# Patient Record
Sex: MALE | Race: WHITE | NOT HISPANIC OR LATINO | Employment: FULL TIME | URBAN - METROPOLITAN AREA
[De-identification: names, ages, dates, MRNs, and addresses within clinical notes are randomized per-mention and may not be internally consistent; named-entity substitution may affect disease eponyms.]

---

## 2017-01-09 DIAGNOSIS — Z13.9 SCREENING: Primary | ICD-10-CM

## 2017-01-10 ENCOUNTER — OFFICE VISIT (OUTPATIENT)
Dept: FAMILY MEDICINE CLINIC | Facility: CLINIC | Age: 57
End: 2017-01-10

## 2017-01-10 VITALS
HEIGHT: 75 IN | DIASTOLIC BLOOD PRESSURE: 60 MMHG | RESPIRATION RATE: 18 BRPM | HEART RATE: 70 BPM | OXYGEN SATURATION: 97 % | SYSTOLIC BLOOD PRESSURE: 100 MMHG | WEIGHT: 261 LBS | BODY MASS INDEX: 32.45 KG/M2 | TEMPERATURE: 98.2 F

## 2017-01-10 DIAGNOSIS — E78.5 HYPERLIPIDEMIA, UNSPECIFIED HYPERLIPIDEMIA TYPE: Primary | ICD-10-CM

## 2017-01-10 LAB
ALBUMIN SERPL-MCNC: 4.5 G/DL (ref 3.5–5.2)
ALBUMIN/GLOB SERPL: 1.4 G/DL
ALP SERPL-CCNC: 65 U/L (ref 39–117)
ALT SERPL W P-5'-P-CCNC: 22 U/L (ref 1–41)
ANION GAP SERPL CALCULATED.3IONS-SCNC: 14.5 MMOL/L
AST SERPL-CCNC: 15 U/L (ref 1–40)
BILIRUB SERPL-MCNC: 0.5 MG/DL (ref 0.1–1.2)
BUN BLD-MCNC: 21 MG/DL (ref 6–20)
BUN/CREAT SERPL: 21.6 (ref 7–25)
CALCIUM SPEC-SCNC: 9.4 MG/DL (ref 8.6–10.5)
CHLORIDE SERPL-SCNC: 100 MMOL/L (ref 98–107)
CHOLEST SERPL-MCNC: 196 MG/DL (ref 0–200)
CO2 SERPL-SCNC: 23.5 MMOL/L (ref 22–29)
CREAT BLD-MCNC: 0.97 MG/DL (ref 0.76–1.27)
ERYTHROCYTE [DISTWIDTH] IN BLOOD BY AUTOMATED COUNT: 12.5 % (ref 4.5–15)
GFR SERPL CREATININE-BSD FRML MDRD: 80 ML/MIN/1.73
GLOBULIN UR ELPH-MCNC: 3.3 GM/DL
GLUCOSE BLD-MCNC: 96 MG/DL (ref 65–99)
HCT VFR BLD AUTO: 47.8 % (ref 35–60)
HDLC SERPL-MCNC: 39 MG/DL (ref 40–60)
HGB BLD-MCNC: 16 G/DL (ref 13.5–18)
LDLC SERPL CALC-MCNC: 136 MG/DL (ref 0–100)
LDLC/HDLC SERPL: 3.49 {RATIO}
LYMPHOCYTES # BLD AUTO: 2.9 10*3/MM3 (ref 1.2–3.4)
LYMPHOCYTES NFR BLD AUTO: 28 % (ref 21–51)
MCH RBC QN AUTO: 30.4 PG (ref 26.1–33.1)
MCHC RBC AUTO-ENTMCNC: 33.5 G/DL (ref 33–37)
MCV RBC AUTO: 90.7 FL (ref 80–99)
MONOCYTES # BLD AUTO: 0.5 10*3/MM3 (ref 0.1–0.6)
MONOCYTES NFR BLD AUTO: 5 % (ref 2–9)
NEUTROPHILS # BLD AUTO: 6.9 10*3/MM3 (ref 1.4–6.5)
NEUTROPHILS NFR BLD AUTO: 67 % (ref 42–75)
PLATELET # BLD AUTO: 235 10*3/MM3 (ref 150–450)
PMV BLD AUTO: 9 FL (ref 7.1–10.5)
POTASSIUM BLD-SCNC: 4.4 MMOL/L (ref 3.5–5.2)
PROT SERPL-MCNC: 7.8 G/DL (ref 6–8.5)
RBC # BLD AUTO: 5.26 10*6/MM3 (ref 4–6)
SODIUM BLD-SCNC: 138 MMOL/L (ref 136–145)
TRIGL SERPL-MCNC: 104 MG/DL (ref 0–150)
VLDLC SERPL-MCNC: 20.8 MG/DL (ref 5–40)
WBC NRBC COR # BLD: 10.3 10*3/MM3 (ref 4.5–10)

## 2017-01-10 PROCEDURE — 85025 COMPLETE CBC W/AUTO DIFF WBC: CPT | Performed by: FAMILY MEDICINE

## 2017-01-10 PROCEDURE — 84436 ASSAY OF TOTAL THYROXINE: CPT | Performed by: FAMILY MEDICINE

## 2017-01-10 PROCEDURE — 84479 ASSAY OF THYROID (T3 OR T4): CPT | Performed by: FAMILY MEDICINE

## 2017-01-10 PROCEDURE — 80061 LIPID PANEL: CPT | Performed by: FAMILY MEDICINE

## 2017-01-10 PROCEDURE — 84443 ASSAY THYROID STIM HORMONE: CPT | Performed by: FAMILY MEDICINE

## 2017-01-10 PROCEDURE — 80053 COMPREHEN METABOLIC PANEL: CPT | Performed by: FAMILY MEDICINE

## 2017-01-10 PROCEDURE — 99213 OFFICE O/P EST LOW 20 MIN: CPT | Performed by: FAMILY MEDICINE

## 2017-01-10 NOTE — PROGRESS NOTES
SUBJECTIVE:  The patient is a 56-year-old white male is here for follow-up and labs.  He has a history of hyperlipidemia but is not on any medicines currently.  Been dieting and lost quite a bit of weight as of late.    PAST MEDICAL HISTORY:  Reviewed.    REVIEW OF SYSTEMS:  Please see above; 14 point ROS otherwise negative.      OBJECTIVE: Vitals signs are reviewed and are stable.    HEENT: PERRLA.    Neck:  Supple.    Lungs:  Clear.    Heart:  Regular rate and rhythm.    Abdomen:   Soft, nontender.    Extremities:  No cyanosis, clubbing or edema.      ASSESSMENT:     Hyperlipidemia    PLAN:   NP fasting lipids.  Follow-up in a couple days on her labs.  Call if problems.  Diet and exercise are discussed.    Much of this encounter note is an electronic transcription/translation of spoken language to printed text.  The electronic translation of spoken language may permit erroneous, or at times, nonsensical words or phrases to be inadvertently transcribed.  Although I have reviewed the note for such errors, some may still exist.

## 2017-01-10 NOTE — MR AVS SNAPSHOT
Aguilar Kennedy   1/10/2017 1:50 PM   Office Visit    Dept Phone:  [phone]   Encounter #:  [id number]    Provider:  Jerzy Leon MD   Department:  [facility] MED                Your Full Care Plan              Today's Medication Changes          These changes are accurate as of: 1/10/17  3:34 PM.  If you have any questions, ask your nurse or doctor.               Stop taking medication(s)listed here:     diclofenac 50 MG EC tablet   Commonly known as:  VOLTAREN   Stopped by:  Jerzy Leon MD           fluticasone 50 MCG/ACT nasal spray   Commonly known as:  FLONASE   Stopped by:  Jerzy Leon MD                      Your Updated Medication List      Notice  As of 1/10/2017  3:34 PM    You have not been prescribed any medications.            We Performed the Following     Thyroid Panel With TSH       You Were Diagnosed With        Codes Comments    Hyperlipidemia, unspecified hyperlipidemia type    -  Primary ICD-10-CM: E78.5  ICD-9-CM: 272.4       Instructions     None    Patient Instructions History      Upcoming Appointments     Visit Type Date Time Department    WALK-IN 1/10/2017  1:50 PM [facility]      [employer] Signup     [facility] [employer] allows you to send messages to your doctor, view your test results, renew your prescriptions, schedule appointments, and more. To sign up, go to [employer] and click on the Sign Up Now link in the New User? box. Enter your [employer] Activation Code exactly as it appears below along with the last four digits of your Social Security Number and your Date of Birth () to complete the sign-up process. If you do not sign up before the expiration date, you must request a new code.    [employer] Activation Code: [email or web address]  Expires: 2017  3:33 PM    If you have questions, you can email [email or web address] or call [phone] to talk to our [employer] staff. Remember, [employer]  "is NOT to be used for urgent needs. For medical emergencies, dial 911.               Other Info from Your Visit           Allergies     No Known Allergies      Reason for Visit     Hyperlipidemia had been on meds in past now has been normal has family hx of carotid artery stenosis and small in size    Foot Pain leland      Vital Signs     Blood Pressure Pulse Temperature Respirations Height Weight    100/60 (BP Location: Left arm, Patient Position: Sitting) 70 98.2 °F (36.8 °C) (Oral) 18 75\" (190.5 cm) 261 lb (118 kg)    Oxygen Saturation Body Mass Index Smoking Status             97% 32.62 kg/m2 Former Smoker         Problems and Diagnoses Noted     High cholesterol or triglycerides    -  Primary        "

## 2017-01-11 LAB
T-UPTAKE NFR SERPL: 1.1 TBI (ref 0.8–1.3)
T4 SERPL-MCNC: 7 MCG/DL (ref 4.5–11.7)
TSH SERPL DL<=0.05 MIU/L-ACNC: 1.18 MIU/ML (ref 0.27–4.2)

## 2017-12-08 ENCOUNTER — OFFICE VISIT (OUTPATIENT)
Dept: FAMILY MEDICINE CLINIC | Facility: CLINIC | Age: 57
End: 2017-12-08

## 2017-12-08 VITALS
TEMPERATURE: 97.9 F | BODY MASS INDEX: 32.33 KG/M2 | DIASTOLIC BLOOD PRESSURE: 72 MMHG | WEIGHT: 260 LBS | HEIGHT: 75 IN | HEART RATE: 78 BPM | SYSTOLIC BLOOD PRESSURE: 132 MMHG | OXYGEN SATURATION: 97 %

## 2017-12-08 DIAGNOSIS — D17.1 LIPOMA OF TORSO: ICD-10-CM

## 2017-12-08 DIAGNOSIS — M79.672 PAIN OF LEFT HEEL: Primary | ICD-10-CM

## 2017-12-08 DIAGNOSIS — M72.2 PLANTAR FASCIITIS: ICD-10-CM

## 2017-12-08 DIAGNOSIS — E66.09 CLASS 1 OBESITY DUE TO EXCESS CALORIES WITHOUT SERIOUS COMORBIDITY WITH BODY MASS INDEX (BMI) OF 32.0 TO 32.9 IN ADULT: ICD-10-CM

## 2017-12-08 PROCEDURE — 73630 X-RAY EXAM OF FOOT: CPT | Performed by: NURSE PRACTITIONER

## 2017-12-08 PROCEDURE — 99213 OFFICE O/P EST LOW 20 MIN: CPT | Performed by: NURSE PRACTITIONER

## 2017-12-08 RX ORDER — IBUPROFEN 800 MG/1
800 TABLET ORAL EVERY 6 HOURS PRN
Qty: 60 TABLET | Refills: 1 | Status: SHIPPED | OUTPATIENT
Start: 2017-12-08 | End: 2020-08-07

## 2017-12-08 NOTE — PATIENT INSTRUCTIONS
erx  mg TID prn pain after meal for the next 10-14 days, xray shows heel spur, gave pt education on plantar fasciitis and enc stretches at home, gave education on lipoma and defer sgn consult at this time, Enc healthy diet and regular exercise for wt loss  Lipoma  A lipoma is a noncancerous (benign) tumor that is made up of fat cells. This is a very common type of soft-tissue growth. Lipomas are usually found under the skin (subcutaneous). They may occur in any tissue of the body that contains fat. Common areas for lipomas to appear include the back, shoulders, buttocks, and thighs. Lipomas grow slowly, and they are usually painless. Most lipomas do not cause problems and do not require treatment.  CAUSES  The cause of this condition is not known.  RISK FACTORS  This condition is more likely to develop in:  · People who are 40-60 years old.  · People who have a family history of lipomas.  SYMPTOMS  A lipoma usually appears as a small, round bump under the skin. It may feel soft or rubbery, but the firmness can vary. Most lipomas are not painful. However, a lipoma may become painful if it is located in an area where it pushes on nerves.  DIAGNOSIS  A lipoma can usually be diagnosed with a physical exam. You may also have tests to confirm the diagnosis and to rule out other conditions. Tests may include:  · Imaging tests, such as a CT scan or MRI.  · Removal of a tissue sample to be looked at under a microscope (biopsy).  TREATMENT  Treatment is not needed for small lipomas that are not causing problems. If a lipoma continues to get bigger or it causes problems, removal is often the best option. Lipomas can also be removed to improve appearance. Removal of a lipoma is usually done with a surgery in which the fatty cells and the surrounding capsule are removed. Most often, a medicine that numbs the area (local anesthetic) is used for this procedure.  HOME CARE INSTRUCTIONS  · Keep all follow-up visits as  directed by your health care provider. This is important.  SEEK MEDICAL CARE IF:  · Your lipoma becomes larger or hard.  · Your lipoma becomes painful, red, or increasingly swollen. These could be signs of infection or a more serious condition.     This information is not intended to replace advice given to you by your health care provider. Make sure you discuss any questions you have with your health care provider.     Document Released: 12/08/2003 Document Revised: 05/03/2016 Document Reviewed: 12/14/2015  [employer] Interactive Patient Education ©2017 [employer]    Plantar Fasciitis With Rehab  The plantar fascia is a fibrous, ligament-like, soft-tissue structure that spans the bottom of the foot. Plantar fasciitis, also called heel spur syndrome, is a condition that causes pain in the foot due to inflammation of the tissue.  SYMPTOMS   · Pain and tenderness on the underneath side of the foot.  · Pain that worsens with standing or walking.  CAUSES   Plantar fasciitis is caused by irritation and injury to the plantar fascia on the underneath side of the foot. Common mechanisms of injury include:  · Direct trauma to bottom of the foot.  · Damage to a small nerve that runs under the foot where the main fascia attaches to the heel bone.  · Stress placed on the plantar fascia due to bone spurs.  RISK INCREASES WITH:   · Activities that place stress on the plantar fascia (running, jumping, pivoting, or cutting).  · Poor strength and flexibility.  · Improperly fitted shoes.  · Tight calf muscles.  · Flat feet.  · Failure to warm-up properly before activity.  · Obesity.  PREVENTION  · Warm up and stretch properly before activity.  · Allow for adequate recovery between workouts.  · Maintain physical fitness:    Strength, flexibility, and endurance.    Cardiovascular fitness.  · Maintain a health body weight.  · Avoid stress on the plantar fascia.  · Wear properly fitted shoes, including arch supports for individuals who  have flat feet.  PROGNOSIS   If treated properly, then the symptoms of plantar fasciitis usually resolve without surgery. However, occasionally surgery is necessary.  RELATED COMPLICATIONS   · Recurrent symptoms that may result in a chronic condition.  · Problems of the lower back that are caused by compensating for the injury, such as limping.  · Pain or weakness of the foot during push-off following surgery.  · Chronic inflammation, scarring, and partial or complete fascia tear, occurring more often from repeated injections.  TREATMENT   Treatment initially involves the use of ice and medication to help reduce pain and inflammation. The use of strengthening and stretching exercises may help reduce pain with activity, especially stretches of the Achilles tendon. These exercises may be performed at home or with a therapist. Your caregiver may recommend that you use heel cups of arch supports to help reduce stress on the plantar fascia. Occasionally, corticosteroid injections are given to reduce inflammation. If symptoms persist for greater than 6 months despite non-surgical (conservative), then surgery may be recommended.   MEDICATION   · If pain medication is necessary, then nonsteroidal anti-inflammatory medications, such as aspirin and ibuprofen, or other minor pain relievers, such as acetaminophen, are often recommended.  · Do not take pain medication within 7 days before surgery.  · Prescription pain relievers may be given if deemed necessary by your caregiver. Use only as directed and only as much as you need.  · Corticosteroid injections may be given by your caregiver. These injections should be reserved for the most serious cases, because they may only be given a certain number of times.  HEAT AND COLD  · Cold treatment (icing) relieves pain and reduces inflammation. Cold treatment should be applied for 10 to 15 minutes every 2 to 3 hours for inflammation and pain and immediately after any activity that  aggravates your symptoms. Use ice packs or massage the area with a piece of ice (ice massage).  · Heat treatment may be used prior to performing the stretching and strengthening activities prescribed by your caregiver, physical therapist, or . Use a heat pack or soak the injury in warm water.  SEEK IMMEDIATE MEDICAL CARE IF:  · Treatment seems to offer no benefit, or the condition worsens.  · Any medications produce adverse side effects.  EXERCISES  RANGE OF MOTION (ROM) AND STRETCHING EXERCISES - Plantar Fasciitis (Heel Spur Syndrome)  These exercises may help you when beginning to rehabilitate your injury. Your symptoms may resolve with or without further involvement from your physician, physical therapist or . While completing these exercises, remember:   · Restoring tissue flexibility helps normal motion to return to the joints. This allows healthier, less painful movement and activity.  · An effective stretch should be held for at least 30 seconds.  · A stretch should never be painful. You should only feel a gentle lengthening or release in the stretched tissue.  RANGE OF MOTION - Toe Extension, Flexion  · Sit with your right / left leg crossed over your opposite knee.  · Grasp your toes and gently pull them back toward the top of your foot. You should feel a stretch on the bottom of your toes and/or foot.  · Hold this stretch for __________ seconds.  · Now, gently pull your toes toward the bottom of your foot. You should feel a stretch on the top of your toes and or foot.  · Hold this stretch for __________ seconds.  Repeat __________ times. Complete this stretch __________ times per day.   RANGE OF MOTION - Ankle Dorsiflexion, Active Assisted  · Remove shoes and sit on a chair that is preferably not on a carpeted surface.  · Place right / left foot under knee. Extend your opposite leg for support.  · Keeping your heel down, slide your right / left foot back toward the chair  until you feel a stretch at your ankle or calf. If you do not feel a stretch, slide your bottom forward to the edge of the chair, while still keeping your heel down.  · Hold this stretch for __________ seconds.  Repeat __________ times. Complete this stretch __________ times per day.   STRETCH - Gastroc, Standing  · Place hands on wall.  · Extend right / left leg, keeping the front knee somewhat bent.  · Slightly point your toes inward on your back foot.  · Keeping your right / left heel on the floor and your knee straight, shift your weight toward the wall, not allowing your back to arch.  · You should feel a gentle stretch in the right / left calf. Hold this position for __________ seconds.  Repeat __________ times. Complete this stretch __________ times per day.  STRETCH - Soleus, Standing  · Place hands on wall.  · Extend right / left leg, keeping the other knee somewhat bent.  · Slightly point your toes inward on your back foot.  · Keep your right / left heel on the floor, bend your back knee, and slightly shift your weight over the back leg so that you feel a gentle stretch deep in your back calf.  · Hold this position for __________ seconds.  Repeat __________ times. Complete this stretch __________ times per day.  STRETCH - Gastrocsoleus, Standing   Note: This exercise can place a lot of stress on your foot and ankle. Please complete this exercise only if specifically instructed by your caregiver.   · Place the ball of your right / left foot on a step, keeping your other foot firmly on the same step.  · Hold on to the wall or a rail for balance.  · Slowly lift your other foot, allowing your body weight to press your heel down over the edge of the step.  · You should feel a stretch in your right / left calf.  · Hold this position for __________ seconds.  · Repeat this exercise with a slight bend in your right / left knee.  Repeat __________ times. Complete this stretch __________ times per day.    STRENGTHENING EXERCISES - Plantar Fasciitis (Heel Spur Syndrome)   These exercises may help you when beginning to rehabilitate your injury. They may resolve your symptoms with or without further involvement from your physician, physical therapist or . While completing these exercises, remember:   · Muscles can gain both the endurance and the strength needed for everyday activities through controlled exercises.  · Complete these exercises as instructed by your physician, physical therapist or . Progress the resistance and repetitions only as guided.  STRENGTH - Towel Curls  · Sit in a chair positioned on a non-carpeted surface.  · Place your foot on a towel, keeping your heel on the floor.  · Pull the towel toward your heel by only curling your toes. Keep your heel on the floor.  · If instructed by your physician, physical therapist or , add ____________________ at the end of the towel.  Repeat __________ times. Complete this exercise __________ times per day.  STRENGTH - Ankle Inversion  · Secure one end of a rubber exercise band/tubing to a fixed object (table, pole). Loop the other end around your foot just before your toes.  · Place your fists between your knees. This will focus your strengthening at your ankle.  · Slowly, pull your big toe up and in, making sure the band/tubing is positioned to resist the entire motion.  · Hold this position for __________ seconds.  · Have your muscles resist the band/tubing as it slowly pulls your foot back to the starting position.  Repeat __________ times. Complete this exercises __________ times per day.      This information is not intended to replace advice given to you by your health care provider. Make sure you discuss any questions you have with your health care provider.     Document Released: 12/18/2006 Document Revised: 05/03/2016 Document Reviewed: 10/31/2016  [employer] Interactive Patient Education ©2017 [employer]

## 2017-12-08 NOTE — PROGRESS NOTES
Subjective   Aguilar Kennedy is a 57 y.o. male.     History of Present Illness   C/o R heel pain x 3 wks, no injury trauma or trigger event, prolonged walking with job works , wears steel toed boots, has tried insert and helped somewhat, tried switching shoes without much change, pain 4/10, OTC  mg helps, With hx of B foot pain 05/16 Dr Bui xray NAD erx diclofenac and sent to podiatry Dr Marquez but never went  C/o enlarging bump on upper back > 1 year not enlarging nontender but wondering if needs removed, fam hx father skin ca on lip  Admits working on healthy low carb diet and exercise with weight loss 24 lbs since 05/16    The following portions of the patient's history were reviewed and updated as appropriate: allergies, current medications, past family history, past medical history, past social history, past surgical history and problem list.    Review of Systems   Constitutional: Negative for fever.   Respiratory: Negative for cough, shortness of breath and wheezing.    Cardiovascular: Negative for chest pain, palpitations and leg swelling.   Musculoskeletal: Positive for arthralgias. Negative for back pain, gait problem, joint swelling, myalgias, neck pain and neck stiffness.   Skin:        Skin nodule   Neurological: Negative for dizziness and headaches.   All other systems reviewed and are negative.      Objective   Physical Exam   Constitutional: He is oriented to person, place, and time. He appears well-developed and well-nourished.   HENT:   Head: Normocephalic and atraumatic.   Eyes: Conjunctivae and EOM are normal. Pupils are equal, round, and reactive to light.   Cardiovascular: Normal rate, regular rhythm and normal heart sounds.    Pulmonary/Chest: Effort normal and breath sounds normal.   Musculoskeletal: Normal range of motion. He exhibits tenderness (L heel point tenderness, no plantar warts, sensation intact, pulses strong, well hydrated, no ulcers or fungal toenails, FROM;  B tenderness plantar fasciia).   Neurological: He is alert and oriented to person, place, and time.   Skin: Skin is warm and dry.   Flesh colored well circumscribed nodule R upper back nontender, no erythema or dc or bleeding   Psychiatric: He has a normal mood and affect. His behavior is normal. Judgment and thought content normal.   Vitals reviewed.  xray L foot 3v without comparison for heel pain shows L heel spur awaiting radiology review    Assessment/Plan   Aguilar was seen today for foot swelling and cyst.    Diagnoses and all orders for this visit:    Pain of left heel  -     XR foot 3+ vw left    Plantar fasciitis    Lipoma of torso    Class 1 obesity due to excess calories without serious comorbidity with body mass index (BMI) of 32.0 to 32.9 in adult    Other orders  -     ibuprofen (ADVIL,MOTRIN) 800 MG tablet; Take 1 tablet by mouth Every 6 (Six) Hours As Needed for Moderate Pain .    erx  mg TID prn pain after meal for the next 10-14 days, xray shows heel spur, gave pt education on plantar fasciitis and enc stretches at home, gave education on lipoma and defer sgn consult at this time, Enc healthy diet and regular exercise for wt loss

## 2017-12-12 ENCOUNTER — TELEPHONE (OUTPATIENT)
Dept: FAMILY MEDICINE CLINIC | Facility: CLINIC | Age: 57
End: 2017-12-12

## 2017-12-12 NOTE — TELEPHONE ENCOUNTER
Xray foot showing mild arthritis and narrowing at 1st toe, how is he feeling on IBU for heel spur?

## 2018-08-15 ENCOUNTER — TELEPHONE (OUTPATIENT)
Dept: FAMILY MEDICINE CLINIC | Facility: CLINIC | Age: 58
End: 2018-08-15

## 2018-08-15 DIAGNOSIS — M79.672 LEFT FOOT PAIN: Primary | ICD-10-CM

## 2018-08-15 DIAGNOSIS — M77.32 HEEL SPUR, LEFT: ICD-10-CM

## 2018-08-15 DIAGNOSIS — M19.90 ARTHRITIS: ICD-10-CM

## 2018-08-15 NOTE — TELEPHONE ENCOUNTER
Wanting a referral for a podiatrist BC he has pain in his heels, ASAP please... They want it in the [facility] area by [facility]... Dr hutchison possibly

## 2018-12-13 ENCOUNTER — TELEPHONE (OUTPATIENT)
Dept: FAMILY MEDICINE CLINIC | Facility: CLINIC | Age: 58
End: 2018-12-13

## 2018-12-13 NOTE — TELEPHONE ENCOUNTER
PT WAS SEEN OUT OF STATE AT AN URGENT CARE OFFICE FOR LOWER BACK PAIN AND SPASMS. BECAUSE OF HIS INSURANCE, [employer], HE IS NEEDING A REFERRAL REGARDING THIS AND ASKED IF DR. CLARKE CAN DO THIS?

## 2019-03-26 ENCOUNTER — TELEPHONE (OUTPATIENT)
Dept: FAMILY MEDICINE CLINIC | Facility: CLINIC | Age: 59
End: 2019-03-26

## 2019-03-26 RX ORDER — ONDANSETRON 4 MG/1
4 TABLET, FILM COATED ORAL EVERY 8 HOURS PRN
Qty: 20 TABLET | Refills: 0 | Status: SHIPPED | OUTPATIENT
Start: 2019-03-26 | End: 2019-03-26

## 2019-03-26 RX ORDER — SCOLOPAMINE TRANSDERMAL SYSTEM 1 MG/1
1 PATCH, EXTENDED RELEASE TRANSDERMAL
Qty: 10 EACH | Refills: 0 | Status: SHIPPED | OUTPATIENT
Start: 2019-03-26 | End: 2020-08-07

## 2019-03-26 RX ORDER — ONDANSETRON 4 MG/1
8 TABLET, FILM COATED ORAL EVERY 8 HOURS PRN
Qty: 10 TABLET | Refills: 0 | Status: SHIPPED | OUTPATIENT
Start: 2019-03-26 | End: 2019-03-26

## 2019-03-26 RX ORDER — ONDANSETRON 4 MG/1
TABLET, FILM COATED ORAL
Qty: 10 TABLET | Refills: 0 | Status: SHIPPED | OUTPATIENT
Start: 2019-03-26 | End: 2020-08-07

## 2019-03-26 NOTE — TELEPHONE ENCOUNTER
PATIENT WANTS TO KNOW IF ZOFRAN AND SCOPOLAMINE PATCHES.       PATIENT IS GOING ON A CRUISE FOR 14 DAY AND WANTS TO KNOW IF THESE CAN BE CALLED IN

## 2020-08-07 ENCOUNTER — OFFICE VISIT (OUTPATIENT)
Dept: FAMILY MEDICINE CLINIC | Facility: CLINIC | Age: 60
End: 2020-08-07

## 2020-08-07 VITALS
TEMPERATURE: 97.9 F | RESPIRATION RATE: 20 BRPM | DIASTOLIC BLOOD PRESSURE: 64 MMHG | WEIGHT: 284 LBS | HEART RATE: 74 BPM | SYSTOLIC BLOOD PRESSURE: 126 MMHG | HEIGHT: 75 IN | OXYGEN SATURATION: 96 % | BODY MASS INDEX: 35.31 KG/M2

## 2020-08-07 DIAGNOSIS — Z11.59 ENCOUNTER FOR HEPATITIS C SCREENING TEST FOR LOW RISK PATIENT: ICD-10-CM

## 2020-08-07 DIAGNOSIS — R06.02 SHORTNESS OF BREATH: ICD-10-CM

## 2020-08-07 DIAGNOSIS — G47.33 OSA ON CPAP: ICD-10-CM

## 2020-08-07 DIAGNOSIS — Z99.89 OSA ON CPAP: ICD-10-CM

## 2020-08-07 DIAGNOSIS — E66.01 CLASS 2 SEVERE OBESITY DUE TO EXCESS CALORIES WITH SERIOUS COMORBIDITY AND BODY MASS INDEX (BMI) OF 35.0 TO 35.9 IN ADULT (HCC): ICD-10-CM

## 2020-08-07 DIAGNOSIS — Z12.5 PROSTATE CANCER SCREENING: ICD-10-CM

## 2020-08-07 DIAGNOSIS — R53.82 CHRONIC FATIGUE: ICD-10-CM

## 2020-08-07 DIAGNOSIS — D17.9 LIPOMA, UNSPECIFIED SITE: ICD-10-CM

## 2020-08-07 DIAGNOSIS — R07.89 CHEST TIGHTNESS: Primary | ICD-10-CM

## 2020-08-07 LAB
25(OH)D3 SERPL-MCNC: 28.2 NG/ML (ref 30–100)
ALBUMIN SERPL-MCNC: 4.3 G/DL (ref 3.5–5.2)
ALBUMIN/GLOB SERPL: 1.7 G/DL
ALP SERPL-CCNC: 53 U/L (ref 39–117)
ALT SERPL W P-5'-P-CCNC: 26 U/L (ref 1–41)
ANION GAP SERPL CALCULATED.3IONS-SCNC: 8.7 MMOL/L (ref 5–15)
AST SERPL-CCNC: 18 U/L (ref 1–40)
BACTERIA UR QL AUTO: ABNORMAL /HPF
BILIRUB SERPL-MCNC: 0.6 MG/DL (ref 0–1.2)
BILIRUB UR QL STRIP: NEGATIVE
BUN SERPL-MCNC: 15 MG/DL (ref 8–23)
BUN/CREAT SERPL: 15.6 (ref 7–25)
CALCIUM SPEC-SCNC: 8.9 MG/DL (ref 8.6–10.5)
CHLORIDE SERPL-SCNC: 102 MMOL/L (ref 98–107)
CHOLEST SERPL-MCNC: 178 MG/DL (ref 0–200)
CLARITY UR: CLEAR
CO2 SERPL-SCNC: 25.3 MMOL/L (ref 22–29)
COLOR UR: YELLOW
CREAT SERPL-MCNC: 0.96 MG/DL (ref 0.76–1.27)
ERYTHROCYTE [DISTWIDTH] IN BLOOD BY AUTOMATED COUNT: 13.4 % (ref 12.3–15.4)
GFR SERPL CREATININE-BSD FRML MDRD: 80 ML/MIN/1.73
GLOBULIN UR ELPH-MCNC: 2.5 GM/DL
GLUCOSE SERPL-MCNC: 127 MG/DL (ref 65–99)
GLUCOSE UR STRIP-MCNC: NEGATIVE MG/DL
HCT VFR BLD AUTO: 47.9 % (ref 37.5–51)
HCV AB SER DONR QL: NORMAL
HDLC SERPL-MCNC: 36 MG/DL (ref 40–60)
HGB BLD-MCNC: 15.7 G/DL (ref 13–17.7)
HGB UR QL STRIP.AUTO: ABNORMAL
KETONES UR QL STRIP: NEGATIVE
LDLC SERPL CALC-MCNC: 123 MG/DL (ref 0–100)
LDLC/HDLC SERPL: 3.43 {RATIO}
LEUKOCYTE ESTERASE UR QL STRIP.AUTO: NEGATIVE
LYMPHOCYTES # BLD AUTO: 2.6 10*3/MM3 (ref 0.7–3.1)
LYMPHOCYTES NFR BLD AUTO: 32.3 % (ref 19.6–45.3)
MCH RBC QN AUTO: 30 PG (ref 26.6–33)
MCHC RBC AUTO-ENTMCNC: 32.7 G/DL (ref 31.5–35.7)
MCV RBC AUTO: 91.9 FL (ref 79–97)
MONOCYTES # BLD AUTO: 0.3 10*3/MM3 (ref 0.1–0.9)
MONOCYTES NFR BLD AUTO: 3.2 % (ref 5–12)
NEUTROPHILS NFR BLD AUTO: 5.1 10*3/MM3 (ref 1.7–7)
NEUTROPHILS NFR BLD AUTO: 64.5 % (ref 42.7–76)
NITRITE UR QL STRIP: NEGATIVE
PH UR STRIP.AUTO: 5.5 [PH] (ref 4.6–8)
PLATELET # BLD AUTO: 255 10*3/MM3 (ref 140–450)
PMV BLD AUTO: 8.3 FL (ref 6–12)
POTASSIUM SERPL-SCNC: 4.4 MMOL/L (ref 3.5–5.2)
PROT SERPL-MCNC: 6.8 G/DL (ref 6–8.5)
PROT UR QL STRIP: NEGATIVE
PSA SERPL-MCNC: 5.74 NG/ML (ref 0–4)
RBC # BLD AUTO: 5.21 10*6/MM3 (ref 4.14–5.8)
RBC # UR: ABNORMAL /HPF
REF LAB TEST METHOD: ABNORMAL
SODIUM SERPL-SCNC: 136 MMOL/L (ref 136–145)
SP GR UR STRIP: >=1.03 (ref 1–1.03)
SQUAMOUS #/AREA URNS HPF: ABNORMAL /HPF
TRIGL SERPL-MCNC: 93 MG/DL (ref 0–150)
TSH SERPL DL<=0.05 MIU/L-ACNC: 1.72 UIU/ML (ref 0.27–4.2)
UROBILINOGEN UR QL STRIP: ABNORMAL
VLDLC SERPL-MCNC: 18.6 MG/DL (ref 5–40)
WBC # BLD AUTO: 7.9 10*3/MM3 (ref 3.4–10.8)
WBC UR QL AUTO: ABNORMAL /HPF

## 2020-08-07 PROCEDURE — 81001 URINALYSIS AUTO W/SCOPE: CPT | Performed by: NURSE PRACTITIONER

## 2020-08-07 PROCEDURE — 86803 HEPATITIS C AB TEST: CPT | Performed by: NURSE PRACTITIONER

## 2020-08-07 PROCEDURE — 99214 OFFICE O/P EST MOD 30 MIN: CPT | Performed by: NURSE PRACTITIONER

## 2020-08-07 PROCEDURE — 93000 ELECTROCARDIOGRAM COMPLETE: CPT | Performed by: NURSE PRACTITIONER

## 2020-08-07 PROCEDURE — 85025 COMPLETE CBC W/AUTO DIFF WBC: CPT | Performed by: NURSE PRACTITIONER

## 2020-08-07 PROCEDURE — G0103 PSA SCREENING: HCPCS | Performed by: NURSE PRACTITIONER

## 2020-08-07 PROCEDURE — 82306 VITAMIN D 25 HYDROXY: CPT | Performed by: NURSE PRACTITIONER

## 2020-08-07 PROCEDURE — 36415 COLL VENOUS BLD VENIPUNCTURE: CPT | Performed by: NURSE PRACTITIONER

## 2020-08-07 PROCEDURE — 80061 LIPID PANEL: CPT | Performed by: NURSE PRACTITIONER

## 2020-08-07 PROCEDURE — 84443 ASSAY THYROID STIM HORMONE: CPT | Performed by: NURSE PRACTITIONER

## 2020-08-07 PROCEDURE — 80053 COMPREHEN METABOLIC PANEL: CPT | Performed by: NURSE PRACTITIONER

## 2020-08-07 PROCEDURE — 71046 X-RAY EXAM CHEST 2 VIEWS: CPT | Performed by: NURSE PRACTITIONER

## 2020-08-07 RX ORDER — AZITHROMYCIN 250 MG/1
TABLET, FILM COATED ORAL
Qty: 6 TABLET | Refills: 0 | Status: SHIPPED | OUTPATIENT
Start: 2020-08-07 | End: 2020-09-04

## 2020-08-07 NOTE — PATIENT INSTRUCTIONS
check labs and call with results, CXR and EKG NAD today, consider bronchitis v post infectious viral syndrome from last month and consider COVID antibody testing in a month if sx persist or worsen, erx zpack use as directed and monitor sx, consider sleep apnea consult, defer MOISES, refer gen sgn

## 2020-08-07 NOTE — PROGRESS NOTES
Subjective   Aguilar Kennedy is a 60 y.o. male.     History of Present Illness   C/o fatigue x 2 weeks with SOA and prod cough, with chest tightness and PND but no wheezing sx beginning 07/22/20, no ear pain, sinus pain, sore throat or fever, had chills last week, works  [employer], no known exposure to COVID, lives with wife who doesn't work outside the home, no GI or urin sx prev saw Dr Constantino urology > 10 years for normal prostate biopsy, prev went to [place] approx a month ago with fever and had testing few days later and negative, former smoker x 30 years no prev inhaler no recent CXR, with chronic GERRI using machine and some weight gain but compliant nightly and no recent sleep medicine eval, normal stress test 08/14 no prev dx HTN no CP dizziness HA LE edema but strong fam hx of heart disease and hx of high chol used to take lipitor many years ago  C/o enlarging nodule on upper R back x 1 year no pain and request eval   States UTD colonoscopy within the last 10 years    The following portions of the patient's history were reviewed and updated as appropriate: allergies, current medications, past family history, past medical history, past social history, past surgical history and problem list.    Review of Systems   Constitutional: Positive for chills and fatigue. Negative for activity change, appetite change, diaphoresis, fever and unexpected weight change.   HENT: Positive for congestion and postnasal drip. Negative for dental problem, drooling, ear discharge, ear pain, facial swelling, hearing loss, mouth sores, nosebleeds, rhinorrhea, sinus pressure, sinus pain, sneezing, sore throat, tinnitus, trouble swallowing and voice change.    Respiratory: Positive for cough, chest tightness and shortness of breath. Negative for apnea, choking, wheezing and stridor.    Cardiovascular: Negative for chest pain, palpitations and leg swelling.   Gastrointestinal: Negative for abdominal distention, abdominal  pain, anal bleeding, blood in stool, constipation, diarrhea, nausea, rectal pain and vomiting.   Skin:        Skin nodule   Allergic/Immunologic: Negative for environmental allergies.   Neurological: Negative for dizziness and headaches.   Psychiatric/Behavioral: Positive for sleep disturbance (GERRI).   All other systems reviewed and are negative.      Objective   Physical Exam   Constitutional: He is oriented to person, place, and time. He appears well-developed and well-nourished.   HENT:   Head: Normocephalic and atraumatic.   Right Ear: Hearing and tympanic membrane normal.   Left Ear: Hearing and tympanic membrane normal.   Nose: Nose normal. Right sinus exhibits no maxillary sinus tenderness and no frontal sinus tenderness. Left sinus exhibits no maxillary sinus tenderness and no frontal sinus tenderness.   Mouth/Throat: Oropharynx is clear and moist.   Eyes: Pupils are equal, round, and reactive to light. Conjunctivae and EOM are normal.   Neck: Normal range of motion. Neck supple. No thyromegaly present.   Cardiovascular: Normal rate, regular rhythm and normal heart sounds.   Pulmonary/Chest: Effort normal and breath sounds normal.   Abdominal: Soft. He exhibits no distension and no mass. There is no tenderness. There is no rebound, no guarding and no CVA tenderness. No hernia.   Musculoskeletal: Normal range of motion.   Lymphadenopathy:     He has no cervical adenopathy.   Neurological: He is alert and oriented to person, place, and time.   Skin: Skin is warm and dry.   Flesh colored skin nodule R upper back consistent with lipoma nontender   Psychiatric: He has a normal mood and affect. His behavior is normal. Judgment and thought content normal.   Vitals reviewed.    chest xray 2v without comparison for chest tightness and SOA shows NAD awaiting radiology review    ECG 12 Lead  Date/Time: 8/7/2020 10:22 AM  Performed by: Luci Sánchez APRN  Authorized by: Luci Sánchez APRN   Comparison: not  compared with previous ECG   Rhythm: sinus rhythm  Rate: normal  Conduction: conduction normal  ST Segments: ST segments normal  T Waves: T waves normal  QRS axis: normal  Other: no other findings    Clinical impression: normal ECG          Assessment/Plan   Aguilar was seen today for fatigue and shortness of breath.    Diagnoses and all orders for this visit:    Chest tightness  -     CBC & Differential  -     Comprehensive Metabolic Panel  -     Lipid Panel  -     TSH  -     XR Chest PA & Lateral  -     CBC Auto Differential    Shortness of breath  -     CBC & Differential  -     Comprehensive Metabolic Panel  -     Lipid Panel  -     TSH  -     XR Chest PA & Lateral  -     CBC Auto Differential    Chronic fatigue  -     CBC & Differential  -     Comprehensive Metabolic Panel  -     Lipid Panel  -     TSH  -     Urinalysis With Microscopic - Urine, Clean Catch  -     Vitamin D 25 Hydroxy  -     CBC Auto Differential  -     Urinalysis without microscopic (no culture) - Urine, Clean Catch  -     Urinalysis, Microscopic Only - Urine, Clean Catch    GERRI on CPAP    Prostate cancer screening  -     PSA Screen    Encounter for hepatitis C screening test for low risk patient  -     Hepatitis C Antibody    Lipoma, unspecified site  -     Ambulatory Referral to General Surgery    Class 2 severe obesity due to excess calories with serious comorbidity and body mass index (BMI) of 35.0 to 35.9 in adult (CMS/HCC)    Other orders  -     azithromycin (Zithromax Z-Vladimir) 250 MG tablet; Take 2 tablets the first day, then 1 tablet daily for 4 days.  -     ECG 12 Lead    check labs and call with results, CXR and EKG NAD today, consider bronchitis v post infectious viral syndrome from last month and consider COVID antibody testing in a month if sx persist or worsen, erx zpack use as directed and monitor sx, consider sleep apnea consult, defer MOISES, refer gen sgn

## 2020-08-10 DIAGNOSIS — R53.82 CHRONIC FATIGUE: Primary | ICD-10-CM

## 2020-08-10 DIAGNOSIS — Z20.828 VIRAL DISEASE EXPOSURE: ICD-10-CM

## 2020-08-10 DIAGNOSIS — R97.20 ELEVATED PSA: ICD-10-CM

## 2020-08-10 DIAGNOSIS — R31.9 HEMATURIA, UNSPECIFIED TYPE: ICD-10-CM

## 2020-08-10 RX ORDER — ERGOCALCIFEROL 1.25 MG/1
50000 CAPSULE ORAL WEEKLY
Qty: 12 CAPSULE | Refills: 0 | Status: SHIPPED | OUTPATIENT
Start: 2020-08-10 | End: 2020-09-04

## 2020-09-04 ENCOUNTER — OFFICE VISIT (OUTPATIENT)
Dept: SURGERY | Facility: CLINIC | Age: 60
End: 2020-09-04

## 2020-09-04 VITALS — BODY MASS INDEX: 34.44 KG/M2 | WEIGHT: 277 LBS | HEIGHT: 75 IN

## 2020-09-04 DIAGNOSIS — M79.89 MASS OF SOFT TISSUE: Primary | ICD-10-CM

## 2020-09-04 PROCEDURE — 99203 OFFICE O/P NEW LOW 30 MIN: CPT | Performed by: SURGERY

## 2020-09-04 RX ORDER — ONDANSETRON 2 MG/ML
4 INJECTION INTRAMUSCULAR; INTRAVENOUS EVERY 6 HOURS PRN
Status: CANCELLED | OUTPATIENT
Start: 2020-09-04

## 2020-09-04 RX ORDER — CEFAZOLIN SODIUM IN 0.9 % NACL 3 G/100 ML
3 INTRAVENOUS SOLUTION, PIGGYBACK (ML) INTRAVENOUS ONCE
Status: CANCELLED | OUTPATIENT
Start: 2020-09-28 | End: 2020-09-04

## 2020-09-04 NOTE — PROGRESS NOTES
Office Consultation    Luci Sánchez APRN    Chief Complaint   Patient presents with   • Lipoma       HPI:       Aguilar Kennedy is a 60 y.o. male who presents for evaluation of a subcutaneous nodule located over the right upper back.  The mass has been growing within the past 6 months.  He denies any symptoms.  No history of lipomas or epidermal inclusion cyst.    Past Medical History:   Diagnosis Date   • Hyperlipidemia     history   • Sleep apnea        Past Surgical History:   Procedure Laterality Date   • COLONOSCOPY N/A    • EYE SURGERY Left    • VASECTOMY         Current Outpatient Medications on File Prior to Visit   Medication Sig Dispense Refill   • [DISCONTINUED] azithromycin (Zithromax Z-Vladimir) 250 MG tablet Take 2 tablets the first day, then 1 tablet daily for 4 days. 6 tablet 0   • [DISCONTINUED] vitamin D (ERGOCALCIFEROL) 1.25 MG (31891 UT) capsule capsule Take 1 capsule by mouth 1 (One) Time Per Week. 12 capsule 0     No current facility-administered medications on file prior to visit.        No Known Allergies    Social History     Socioeconomic History   • Marital status:      Spouse name: Not on file   • Number of children: Not on file   • Years of education: Not on file   • Highest education level: Not on file   Tobacco Use   • Smoking status: Former Smoker     Years: 30.00     Types: Cigarettes     Last attempt to quit: 1/10/2008     Years since quittin.6   • Smokeless tobacco: Never Used   Substance and Sexual Activity   • Alcohol use: No   • Drug use: No       Family History   Problem Relation Age of Onset   • Hypertension Mother    • Heart disease Mother    • Heart attack Mother    • Diabetes Mother    • Hypertension Father    • Heart disease Father    • Heart attack Father    • Skin cancer Father         on lip   • No Known Problems Sister    • Heart disease Brother    • Stroke Maternal Grandmother    • Heart attack Maternal Grandmother    • No Known Problems Maternal  "Grandfather    • No Known Problems Paternal Grandmother    • No Known Problems Paternal Grandfather    • No Known Problems Sister    • No Known Problems Brother    • No Known Problems Brother    • Thyroid cancer Daughter        REVIEW OF SYSTEMS    CONSTITUTIONAL: Denies fevers, chills, unintentional weight loss or weight gain  RESPIRATORY: Denies chronic cough or sob.   CARDIAC: Denies chest pain, palpitations, edema  GI: Denies dyspepsia, reflux, heartburn, nausea, vomiting, diarrhea or constipation : Denies dysuria or hematuria.    MUSCULOSKELETAL: Denies muscle weakness and pain   NEURO: Denies chronic headaches.   ENDOCRINE: Denies significant heat or cold intolerance or history of thyroid problems.    DERM: no rashes,lesions or discharge.     Physical Examination  Ht 190.5 cm (75\")   Wt 126 kg (277 lb)   BMI 34.62 kg/m²   Body mass index is 34.62 kg/m².  GENERAL:alert, well appearing, and in no distress  HEENT: normochephalic, atraumatic, no scleral icterus moist mucous membranes.  NECK: Supple there is no thyromegaly or lymphadenopathy  CHEST: clear to auscultation.  Over the right upper back there is a 4 x 4 centimeter soft tissue mass that is mobile and not attached to the planes.  Is not tender.  There is no erythema or signs of infection.  NEURO: alert and oriented, normal speech, cranial nerves 2-12 grossly intact, no focal deficits     Assessment:     The patient is a very pleasant 60 y.o. male with a subcutaneous mass located at the right upper back that is large.  Patient desires resection.  Recommend that he undergoes excisional biopsy under moderate sedation.  Risk and benefits of procedure including bleeding, infection, wound complications were discussed in detail with the patient that verbalized understanding and agreed with the plan       Plan:     1.  Excisional biopsy of right upper back soft tissue mass under MAC  2.  COVID-19 testing    Jason Ty MD  General, Minimally Invasive " and Endoscopic Surgery  [facility]    [address], [address]  [place], [place], [address]  P: [phone]  F: [phone]

## 2020-09-17 ENCOUNTER — TRANSCRIBE ORDERS (OUTPATIENT)
Dept: PREADMISSION TESTING | Facility: HOSPITAL | Age: 60
End: 2020-09-17

## 2020-09-17 DIAGNOSIS — Z01.818 OTHER SPECIFIED PRE-OPERATIVE EXAMINATION: Primary | ICD-10-CM

## 2020-09-25 ENCOUNTER — LAB (OUTPATIENT)
Dept: LAB | Facility: HOSPITAL | Age: 60
End: 2020-09-25

## 2020-09-25 DIAGNOSIS — Z01.818 OTHER SPECIFIED PRE-OPERATIVE EXAMINATION: ICD-10-CM

## 2021-03-15 ENCOUNTER — IMMUNIZATION (OUTPATIENT)
Dept: VACCINE CLINIC | Facility: HOSPITAL | Age: 61
End: 2021-03-15

## 2021-03-15 PROCEDURE — 91300 HC SARSCOV02 VAC 30MCG/0.3ML IM: CPT | Performed by: INTERNAL MEDICINE

## 2021-03-15 PROCEDURE — 0001A: CPT | Performed by: INTERNAL MEDICINE

## 2021-04-05 ENCOUNTER — IMMUNIZATION (OUTPATIENT)
Dept: VACCINE CLINIC | Facility: HOSPITAL | Age: 61
End: 2021-04-05

## 2021-04-05 PROCEDURE — 91300 HC SARSCOV02 VAC 30MCG/0.3ML IM: CPT | Performed by: INTERNAL MEDICINE

## 2021-04-05 PROCEDURE — 0002A: CPT | Performed by: INTERNAL MEDICINE

## 2022-01-18 ENCOUNTER — TELEPHONE (OUTPATIENT)
Dept: FAMILY MEDICINE CLINIC | Facility: CLINIC | Age: 62
End: 2022-01-18

## 2022-01-18 NOTE — TELEPHONE ENCOUNTER
Caller: Aguilar Kennedy    Relationship to patient: Self    Best call back number: [phone]    Date of exposure:    Date of positive COVID19 test: 1/18/22    Date if possible COVID19 exposure:     COVID19 symptoms: FEVER/CHILLS/SORE THROAT/WEAKNESS/FATIGUE    Date of initial quarantine: 1/18/22    Additional information or concerns: PATIENT IS CURRENTLY IN [place] AND IS QUARANTINED.    What is the patients preferred pharmacy: [address] - [phone] [facility] [phone]   [phone]

## 2022-01-18 NOTE — TELEPHONE ENCOUNTER
Spoke with pt, informed quarantine per cdc guidelines. Use otc medication for symptoms and ER if worsening symptoms

## 2023-07-28 ENCOUNTER — OFFICE VISIT (OUTPATIENT)
Dept: FAMILY MEDICINE CLINIC | Facility: CLINIC | Age: 63
End: 2023-07-28
Payer: OTHER GOVERNMENT

## 2023-07-28 VITALS
WEIGHT: 273 LBS | SYSTOLIC BLOOD PRESSURE: 140 MMHG | HEART RATE: 74 BPM | BODY MASS INDEX: 33.94 KG/M2 | TEMPERATURE: 97.3 F | HEIGHT: 75 IN | DIASTOLIC BLOOD PRESSURE: 80 MMHG | RESPIRATION RATE: 18 BRPM | OXYGEN SATURATION: 97 %

## 2023-07-28 DIAGNOSIS — E66.09 CLASS 1 OBESITY DUE TO EXCESS CALORIES WITHOUT SERIOUS COMORBIDITY WITH BODY MASS INDEX (BMI) OF 32.0 TO 32.9 IN ADULT: Primary | ICD-10-CM

## 2023-07-28 DIAGNOSIS — H51.8 SKEW DEVIATION OF LEFT EYE: ICD-10-CM

## 2023-07-28 DIAGNOSIS — R97.20 ELEVATED PSA: ICD-10-CM

## 2023-07-28 DIAGNOSIS — Z12.5 PROSTATE CANCER SCREENING: ICD-10-CM

## 2023-07-28 DIAGNOSIS — G47.33 OSA ON CPAP: ICD-10-CM

## 2023-07-28 DIAGNOSIS — D22.9 CHANGE IN MOLE: ICD-10-CM

## 2023-07-28 DIAGNOSIS — Z99.89 OSA ON CPAP: ICD-10-CM

## 2023-07-28 DIAGNOSIS — M79.89 MASS OF SOFT TISSUE: ICD-10-CM

## 2023-07-28 DIAGNOSIS — E78.2 MIXED HYPERLIPIDEMIA: ICD-10-CM

## 2023-07-28 DIAGNOSIS — Z71.3 WEIGHT LOSS COUNSELING, ENCOUNTER FOR: ICD-10-CM

## 2023-07-28 DIAGNOSIS — Z00.00 PREVENTATIVE HEALTH CARE: ICD-10-CM

## 2023-07-28 NOTE — ASSESSMENT & PLAN NOTE
Discussed risks and benefits and started to go away on incrementally increasing dose.  We will start Wegovy 0.25 mg once a week x4 weeks then increase to Wegovy 0.5 mg weekly.  Instructed patient to return to clinic after 8 weeks for reevaluation patient voiced understanding.    Discussed the importance of healthy diet, nutrition, and lifestyle. Recommend low salt, fat/cholesterol diet and avoid concentrated sweets. Encouraged DASH diet along with fresh fruits & vegetables and low fat dairy products. Counseled patient to exercise/walk as tolerated. Avoid tobacco and alcohol use.

## 2023-07-28 NOTE — PROGRESS NOTES
"Chief Complaint  MASS ON NECK (Needs to have removed), disuss weight loss (On feet a lot having knee pain/ feels if he lost weight it would help), and Labs Only (Due routine labs to check cholesterol glucose,etc.)    Subjective        Aguilar Kennedy presents to [facility]  History of Present Illness      61yo WM Patient was previously seen by PCP at [facility] in 2020, however patient is new to me and is here for establishment of care visit for chronic medical conditions including GERRI and lipoma.    Pt c/o soft tissue mass on left upper right back.    Pt c/o b/l chronic knee pain an dstands at work  a lot and desires weight loss. Pt states his son is an anethestheologist and pt states his son recommended Wogovy. Pt c/o he has tried to lose weight with his diet and is active but not helping with weight loss.  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    Patient also complaining of a rash that is changing color in ulcerations and scabbing on his right temple and desires dermatology referral.  Patient is also complaining of fatigue despite using his CPAP machine on a regular basis.    Discussed colonoscopy needs to be repeated every 10 years if it is negative and Cologuard needs to be repeated every 3 years if it is negative patient voiced understanding.    Objective   Vital Signs:  /80 (BP Location: Left arm, Patient Position: Sitting, Cuff Size: Large Adult)   Pulse 74   Temp 97.3 °F (36.3 °C) (Infrared)   Resp 18   Ht 190.5 cm (75\")   Wt 124 kg (273 lb)   SpO2 97%   BMI 34.12 kg/m²   Estimated body mass index is 34.12 kg/m² as calculated from the following:    Height as of this encounter: 190.5 cm (75\").    Weight as of this encounter: 124 kg (273 lb).             Physical Exam  Constitutional:       Appearance: Normal appearance.   HENT:      Head: Normocephalic and atraumatic.   Eyes:      " Conjunctiva/sclera: Conjunctivae normal.      Comments: Left eye lateral deviation   Cardiovascular:      Rate and Rhythm: Normal rate and regular rhythm.      Heart sounds: Normal heart sounds.   Pulmonary:      Effort: Pulmonary effort is normal.      Breath sounds: Normal breath sounds.   Abdominal:      General: Bowel sounds are normal.      Palpations: Abdomen is soft.      Comments: Non-tender   Skin:     General: Skin is warm.      Comments: Rash on right temple irregular borders and colors.  Right trapezius lipoma   Neurological:      General: No focal deficit present.      Mental Status: He is alert and oriented to person, place, and time.   Psychiatric:         Mood and Affect: Mood normal.         Behavior: Behavior normal.      Result Review :  The following data was reviewed by: ELVIS Jacobo on 07/28/2023:  Common labs          7/28/2023    15:47   Common Labs   Glucose 86    BUN 15    Creatinine 0.94    Sodium 139    Potassium 4.7    Chloride 101    Calcium 9.3    Total Protein 7.2    Albumin 4.6    Total Bilirubin 0.6    Alkaline Phosphatase 64    AST (SGOT) 21    ALT (SGPT) 21    WBC 11.5    Hemoglobin 16.2    Hematocrit 46.8    Platelets 273    Total Cholesterol 204    Triglycerides 91    HDL Cholesterol 50    LDL Cholesterol  138    PSA 7.8                   Assessment and Plan   Diagnoses and all orders for this visit:    1. Class 1 obesity due to excess calories without serious comorbidity with body mass index (BMI) of 32.0 to 32.9 in adult (Primary)  -     CBC & Differential  -     Comprehensive Metabolic Panel  -     Lipid Panel  -     TSH  -     Vitamin B12  -     Semaglutide-Weight Management solution auto-injector 0.25 mg  -     Semaglutide-Weight Management solution auto-injector 0.5 mg    2. GERRI on CPAP  -     CBC & Differential  -     Comprehensive Metabolic Panel  -     Lipid Panel  -     TSH  -     Vitamin B12  -     Semaglutide-Weight Management solution auto-injector  0.25 mg    3. Mass of soft tissue  Assessment & Plan:  Right trapezius lipoma    Orders:  -     CBC & Differential  -     Comprehensive Metabolic Panel  -     Lipid Panel  -     Ambulatory Referral to General Surgery    4. Prostate cancer screening  -     PSA Screen    5. Change in mole  -     Ambulatory Referral to Dermatology    6. Weight loss counseling, encounter for  Assessment & Plan:  Discussed risks and benefits and started to go away on incrementally increasing dose.  We will start Wegovy 0.25 mg once a week x4 weeks then increase to Wegovy 0.5 mg weekly.  Instructed patient to return to clinic after 8 weeks for reevaluation patient voiced understanding.    Discussed the importance of healthy diet, nutrition, and lifestyle. Recommend low salt, fat/cholesterol diet and avoid concentrated sweets. Encouraged DASH diet along with fresh fruits & vegetables and low fat dairy products. Counseled patient to exercise/walk as tolerated. Avoid tobacco and alcohol use.      Orders:  -     Semaglutide-Weight Management solution auto-injector 0.5 mg    7. Skew deviation of left eye  Assessment & Plan:  Recommended follow ophthalmology Evan, patient voiced understanding      8. Preventative health care  -     Ambulatory Referral to Gastroenterology    9. Mixed hyperlipidemia    10. Elevated PSA             Follow Up   Return in about 2 months (around 9/26/2023) for Recheck, Next scheduled follow up, Medicare Wellness.  Patient was given instructions and counseling regarding his condition or for health maintenance advice. Please see specific information pulled into the AVS if appropriate.

## 2023-07-31 ENCOUNTER — TELEPHONE (OUTPATIENT)
Dept: FAMILY MEDICINE CLINIC | Facility: CLINIC | Age: 63
End: 2023-07-31
Payer: OTHER GOVERNMENT

## 2023-07-31 PROBLEM — R97.20 ELEVATED PSA: Status: ACTIVE | Noted: 2023-07-31

## 2023-07-31 PROBLEM — E78.5 HLD (HYPERLIPIDEMIA): Status: ACTIVE | Noted: 2023-07-31

## 2023-07-31 LAB
ALBUMIN SERPL-MCNC: 4.6 G/DL (ref 3.9–4.9)
ALBUMIN/GLOB SERPL: 1.8 {RATIO} (ref 1.2–2.2)
ALP SERPL-CCNC: 64 IU/L (ref 44–121)
ALT SERPL-CCNC: 21 IU/L (ref 0–44)
AST SERPL-CCNC: 21 IU/L (ref 0–40)
BASOPHILS # BLD AUTO: 0.1 X10E3/UL (ref 0–0.2)
BASOPHILS NFR BLD AUTO: 1 %
BILIRUB SERPL-MCNC: 0.6 MG/DL (ref 0–1.2)
BUN SERPL-MCNC: 15 MG/DL (ref 8–27)
BUN/CREAT SERPL: 16 (ref 10–24)
CALCIUM SERPL-MCNC: 9.3 MG/DL (ref 8.6–10.2)
CHLORIDE SERPL-SCNC: 101 MMOL/L (ref 96–106)
CHOLEST SERPL-MCNC: 204 MG/DL (ref 100–199)
CO2 SERPL-SCNC: 21 MMOL/L (ref 20–29)
CREAT SERPL-MCNC: 0.94 MG/DL (ref 0.76–1.27)
EGFRCR SERPLBLD CKD-EPI 2021: 92 ML/MIN/1.73
EOSINOPHIL # BLD AUTO: 0.2 X10E3/UL (ref 0–0.4)
EOSINOPHIL NFR BLD AUTO: 2 %
ERYTHROCYTE [DISTWIDTH] IN BLOOD BY AUTOMATED COUNT: 12.2 % (ref 11.6–15.4)
GLOBULIN SER CALC-MCNC: 2.6 G/DL (ref 1.5–4.5)
GLUCOSE SERPL-MCNC: 86 MG/DL (ref 70–99)
HCT VFR BLD AUTO: 46.8 % (ref 37.5–51)
HDLC SERPL-MCNC: 50 MG/DL
HGB BLD-MCNC: 16.2 G/DL (ref 13–17.7)
IMM GRANULOCYTES # BLD AUTO: 0.1 X10E3/UL (ref 0–0.1)
IMM GRANULOCYTES NFR BLD AUTO: 1 %
LDLC SERPL CALC-MCNC: 138 MG/DL (ref 0–99)
LYMPHOCYTES # BLD AUTO: 2.5 X10E3/UL (ref 0.7–3.1)
LYMPHOCYTES NFR BLD AUTO: 22 %
MCH RBC QN AUTO: 30.5 PG (ref 26.6–33)
MCHC RBC AUTO-ENTMCNC: 34.6 G/DL (ref 31.5–35.7)
MCV RBC AUTO: 88 FL (ref 79–97)
MONOCYTES # BLD AUTO: 1 X10E3/UL (ref 0.1–0.9)
MONOCYTES NFR BLD AUTO: 9 %
NEUTROPHILS # BLD AUTO: 7.7 X10E3/UL (ref 1.4–7)
NEUTROPHILS NFR BLD AUTO: 65 %
PLATELET # BLD AUTO: 273 X10E3/UL (ref 150–450)
POTASSIUM SERPL-SCNC: 4.7 MMOL/L (ref 3.5–5.2)
PROT SERPL-MCNC: 7.2 G/DL (ref 6–8.5)
PSA SERPL-MCNC: 7.8 NG/ML (ref 0–4)
RBC # BLD AUTO: 5.31 X10E6/UL (ref 4.14–5.8)
SODIUM SERPL-SCNC: 139 MMOL/L (ref 134–144)
TRIGL SERPL-MCNC: 91 MG/DL (ref 0–149)
TSH SERPL DL<=0.005 MIU/L-ACNC: 1.12 UIU/ML (ref 0.45–4.5)
VIT B12 SERPL-MCNC: 496 PG/ML (ref 232–1245)
VLDLC SERPL CALC-MCNC: 16 MG/DL (ref 5–40)
WBC # BLD AUTO: 11.5 X10E3/UL (ref 3.4–10.8)

## 2023-07-31 RX ORDER — ATORVASTATIN CALCIUM 10 MG/1
10 TABLET, FILM COATED ORAL DAILY
Qty: 90 TABLET | Refills: 1 | Status: SHIPPED | OUTPATIENT
Start: 2023-07-31 | End: 2024-01-27

## 2023-08-10 ENCOUNTER — TELEPHONE (OUTPATIENT)
Dept: FAMILY MEDICINE CLINIC | Facility: CLINIC | Age: 63
End: 2023-08-10
Payer: OTHER GOVERNMENT

## 2023-08-10 NOTE — TELEPHONE ENCOUNTER
Caller: Denise Kennedy    Relationship to patient: Emergency Contact    Best call back number: [phone]    Patient is needing: PRIOR AUTHORIZATION IS NEEDED FOR THE PATIENTS     Semaglutide-Weight Management 0.25 MG/0.5ML solution auto-injector     PLEASE SEND    PLEASE REACH OUT TO WIFE WHEN THIS IS SENT

## 2023-08-11 NOTE — TELEPHONE ENCOUNTER
Left message for patient to call back, need to know what diet supplements he has tried and what types of diets he has tried.

## 2023-08-14 NOTE — TELEPHONE ENCOUNTER
Patient has tried Phentermine and Qsymira in past he had palpitations with Phentermine and insomnia with the other one. He is currently on a Keto diet and has some ETOH use which is contraindicated with the oral medications. PA submitted.

## 2023-08-18 ENCOUNTER — TELEPHONE (OUTPATIENT)
Dept: FAMILY MEDICINE CLINIC | Facility: CLINIC | Age: 63
End: 2023-08-18
Payer: OTHER GOVERNMENT

## 2023-08-18 ENCOUNTER — OFFICE VISIT (OUTPATIENT)
Dept: SURGERY | Facility: CLINIC | Age: 63
End: 2023-08-18
Payer: OTHER GOVERNMENT

## 2023-08-18 VITALS
SYSTOLIC BLOOD PRESSURE: 140 MMHG | HEIGHT: 75 IN | BODY MASS INDEX: 34.44 KG/M2 | DIASTOLIC BLOOD PRESSURE: 82 MMHG | WEIGHT: 277 LBS

## 2023-08-18 DIAGNOSIS — E66.09 CLASS 1 OBESITY DUE TO EXCESS CALORIES WITHOUT SERIOUS COMORBIDITY WITH BODY MASS INDEX (BMI) OF 32.0 TO 32.9 IN ADULT: Primary | ICD-10-CM

## 2023-08-18 DIAGNOSIS — Z12.11 SCREENING FOR COLON CANCER: ICD-10-CM

## 2023-08-18 DIAGNOSIS — M79.89 MASS OF SOFT TISSUE OF NECK: Primary | ICD-10-CM

## 2023-08-18 PROCEDURE — 99213 OFFICE O/P EST LOW 20 MIN: CPT | Performed by: SURGERY

## 2023-08-18 RX ORDER — ACETAMINOPHEN 325 MG/1
650 TABLET ORAL ONCE
OUTPATIENT
Start: 2023-08-18 | End: 2023-08-18

## 2023-08-18 RX ORDER — SODIUM CHLORIDE 0.9 % (FLUSH) 0.9 %
10 SYRINGE (ML) INJECTION EVERY 12 HOURS SCHEDULED
OUTPATIENT
Start: 2023-08-18

## 2023-08-18 RX ORDER — CELECOXIB 100 MG/1
200 CAPSULE ORAL ONCE
OUTPATIENT
Start: 2023-08-18 | End: 2023-08-18

## 2023-08-18 RX ORDER — SODIUM CHLORIDE 9 MG/ML
40 INJECTION, SOLUTION INTRAVENOUS AS NEEDED
OUTPATIENT
Start: 2023-08-18

## 2023-08-18 RX ORDER — SODIUM CHLORIDE 0.9 % (FLUSH) 0.9 %
10 SYRINGE (ML) INJECTION AS NEEDED
OUTPATIENT
Start: 2023-08-18

## 2023-08-18 RX ORDER — ONDANSETRON 2 MG/ML
4 INJECTION INTRAMUSCULAR; INTRAVENOUS EVERY 6 HOURS PRN
OUTPATIENT
Start: 2023-08-18

## 2023-08-18 NOTE — H&P (VIEW-ONLY)
Office Consultation    Tameka Moreno*    Chief Complaint   Patient presents with    Lipoma       HPI:       Aguilar Kennedy is a 63 y.o. male who presents for evaluation of a subcutaneous nodule located over the neck.  He has been present for several years.  He has noticed increase on the size of the mass with time.  He was supposed to have it removed before COVID pandemic but he decided not to go ahead with it.  He denies any drainage.  Patient had a colonoscopy more than 10 years ago.  He is due for colonoscopy.  Denies any recent change in bowel habits or rectal bleeding.    Past Medical History:   Diagnosis Date    Hyperlipidemia     history    Sleep apnea        Past Surgical History:   Procedure Laterality Date    COLONOSCOPY N/A     EYE SURGERY Left     VASECTOMY         Current Outpatient Medications on File Prior to Visit   Medication Sig Dispense Refill    atorvastatin (LIPITOR) 10 MG tablet Take 1 tablet by mouth Daily for 180 days. 90 tablet 1    NON FORMULARY Balance of nature bid      Semaglutide-Weight Management 0.25 MG/0.5ML solution auto-injector Inject 0.25 mg under the skin into the appropriate area as directed 1 (One) Time Per Week for 28 days. (Patient not taking: Reported on 8/18/2023) 2 mL 0     No current facility-administered medications on file prior to visit.       No Known Allergies    Social History     Socioeconomic History    Marital status:    Tobacco Use    Smoking status: Former     Years: 30.00     Types: Cigarettes     Quit date: 1/10/2008     Years since quitting: 15.6    Smokeless tobacco: Never   Vaping Use    Vaping Use: Never used   Substance and Sexual Activity    Alcohol use: No    Drug use: No    Sexual activity: Yes     Partners: Female       Family History   Problem Relation Age of Onset    Hypertension Mother     Heart disease Mother     Heart attack Mother     Diabetes Mother     Hypertension Father     Heart disease Father     Heart attack Father   "   Skin cancer Father         on lip    No Known Problems Sister     Heart disease Brother     Stroke Maternal Grandmother     Heart attack Maternal Grandmother     No Known Problems Maternal Grandfather     No Known Problems Paternal Grandmother     No Known Problems Paternal Grandfather     No Known Problems Sister     No Known Problems Brother     No Known Problems Brother     Thyroid cancer Daughter        REVIEW OF SYSTEMS    CONSTITUTIONAL: Denies fevers, chills, unintentional weight loss or weight gain  RESPIRATORY: Denies chronic cough or sob.   CARDIAC: Denies chest pain, palpitations, edema  GI: Denies dyspepsia, reflux, heartburn, nausea, vomiting, diarrhea or constipation   : Denies dysuria or hematuria.    MUSCULOSKELETAL: Denies muscle weakness and pain   NEURO: Denies chronic headaches.   ENDOCRINE: Denies significant heat or cold intolerance or history of thyroid problems.    DERM: no rashes,lesions or discharge.     Physical Examination  /82   Ht 190.5 cm (75\")   Wt 126 kg (277 lb)   BMI 34.62 kg/m²   Body mass index is 34.62 kg/m².  GENERAL:alert, well appearing, and in no distress  HEENT: normochephalic, atraumatic, no scleral icterus moist mucous membranes.  NECK: Supple there is  lymphadenopathy, other posterior neck there is a large soft tissue mass of approximately 7 x 7 cm.  Does not seem to be attached to the planes.  Soft.  Mobile and nontender  CHEST: Breathing comfortable  CARDIAC: regular rate and rhythm    EXTREMITIES: no cyanosis, clubbing or edema    NEURO: alert and oriented, normal speech, cranial nerves 2-12 grossly intact, no focal deficits     Labs 7/9/2023:   White blood cell count 11.5, hemoglobin 16.2, hematocrit 46.8, platelets 273  CMP within normal limits    Assessment:     The patient is a very pleasant 63 y.o. male with a subcutaneous mass located at  neck consistent with a lipoma.  We discussed with the patient about treatment options that would include " observation versus excision.  I think that because of the size and the fact has been growing I recommend that he undergoes excisional biopsy under anesthesia.  Risk and benefits of procedure including bleeding, infection, wound complications including dehiscing, infection that may require wound care with dressing changes discussed in detail with the patient that verbalized understanding and agree with the plan  We discussed about the need to undergo screening colonoscopy.  Risk and benefits of procedure including bleeding, infection, perforation discussed in detail with the patient and he wants to proceed with colonoscopy also.        Plan:     -Soft tissue mass excision neck and colonoscopy under moderate sedation in the operative room.  -Surgical scheduling started  -Bowel prep    BMI is >= 30 and <35. (Class 1 Obesity). The following options were offered after discussion;: nutrition counseling/recommendations and pharmacological intervention options       Jason Ty MD  General, Minimally Invasive and Endoscopic Surgery  [facility]    [address], [address]  [place], [place], [address]  P: [phone]  F: [phone]

## 2023-08-18 NOTE — PROGRESS NOTES
Office Consultation    Tameka Moreno*    Chief Complaint   Patient presents with    Lipoma       HPI:       Aguilar Kennedy is a 63 y.o. male who presents for evaluation of a subcutaneous nodule located over the neck.  He has been present for several years.  He has noticed increase on the size of the mass with time.  He was supposed to have it removed before COVID pandemic but he decided not to go ahead with it.  He denies any drainage.  Patient had a colonoscopy more than 10 years ago.  He is due for colonoscopy.  Denies any recent change in bowel habits or rectal bleeding.    Past Medical History:   Diagnosis Date    Hyperlipidemia     history    Sleep apnea        Past Surgical History:   Procedure Laterality Date    COLONOSCOPY N/A     EYE SURGERY Left     VASECTOMY         Current Outpatient Medications on File Prior to Visit   Medication Sig Dispense Refill    atorvastatin (LIPITOR) 10 MG tablet Take 1 tablet by mouth Daily for 180 days. 90 tablet 1    NON FORMULARY Balance of nature bid      Semaglutide-Weight Management 0.25 MG/0.5ML solution auto-injector Inject 0.25 mg under the skin into the appropriate area as directed 1 (One) Time Per Week for 28 days. (Patient not taking: Reported on 8/18/2023) 2 mL 0     No current facility-administered medications on file prior to visit.       No Known Allergies    Social History     Socioeconomic History    Marital status:    Tobacco Use    Smoking status: Former     Years: 30.00     Types: Cigarettes     Quit date: 1/10/2008     Years since quitting: 15.6    Smokeless tobacco: Never   Vaping Use    Vaping Use: Never used   Substance and Sexual Activity    Alcohol use: No    Drug use: No    Sexual activity: Yes     Partners: Female       Family History   Problem Relation Age of Onset    Hypertension Mother     Heart disease Mother     Heart attack Mother     Diabetes Mother     Hypertension Father     Heart disease Father     Heart attack Father   "   Skin cancer Father         on lip    No Known Problems Sister     Heart disease Brother     Stroke Maternal Grandmother     Heart attack Maternal Grandmother     No Known Problems Maternal Grandfather     No Known Problems Paternal Grandmother     No Known Problems Paternal Grandfather     No Known Problems Sister     No Known Problems Brother     No Known Problems Brother     Thyroid cancer Daughter        REVIEW OF SYSTEMS    CONSTITUTIONAL: Denies fevers, chills, unintentional weight loss or weight gain  RESPIRATORY: Denies chronic cough or sob.   CARDIAC: Denies chest pain, palpitations, edema  GI: Denies dyspepsia, reflux, heartburn, nausea, vomiting, diarrhea or constipation   : Denies dysuria or hematuria.    MUSCULOSKELETAL: Denies muscle weakness and pain   NEURO: Denies chronic headaches.   ENDOCRINE: Denies significant heat or cold intolerance or history of thyroid problems.    DERM: no rashes,lesions or discharge.     Physical Examination  /82   Ht 190.5 cm (75\")   Wt 126 kg (277 lb)   BMI 34.62 kg/mý   Body mass index is 34.62 kg/mý.  GENERAL:alert, well appearing, and in no distress  HEENT: normochephalic, atraumatic, no scleral icterus moist mucous membranes.  NECK: Supple there is  lymphadenopathy, other posterior neck there is a large soft tissue mass of approximately 7 x 7 cm.  Does not seem to be attached to the planes.  Soft.  Mobile and nontender  CHEST: Breathing comfortable  CARDIAC: regular rate and rhythm    EXTREMITIES: no cyanosis, clubbing or edema    NEURO: alert and oriented, normal speech, cranial nerves 2-12 grossly intact, no focal deficits     Labs 7/9/2023:   White blood cell count 11.5, hemoglobin 16.2, hematocrit 46.8, platelets 273  CMP within normal limits    Assessment:     The patient is a very pleasant 63 y.o. male with a subcutaneous mass located at  neck consistent with a lipoma.  We discussed with the patient about treatment options that would include " observation versus excision.  I think that because of the size and the fact has been growing I recommend that he undergoes excisional biopsy under anesthesia.  Risk and benefits of procedure including bleeding, infection, wound complications including dehiscing, infection that may require wound care with dressing changes discussed in detail with the patient that verbalized understanding and agree with the plan  We discussed about the need to undergo screening colonoscopy.  Risk and benefits of procedure including bleeding, infection, perforation discussed in detail with the patient and he wants to proceed with colonoscopy also.        Plan:     -Soft tissue mass excision neck and colonoscopy under moderate sedation in the operative room.  -Surgical scheduling started  -Bowel prep    BMI is >= 30 and <35. (Class 1 Obesity). The following options were offered after discussion;: nutrition counseling/recommendations and pharmacological intervention options       Jason Ty MD  General, Minimally Invasive and Endoscopic Surgery  [facility]    [address], [address]  [place], [place], [address]  P: [phone]  F: [phone]

## 2023-08-24 ENCOUNTER — TELEPHONE (OUTPATIENT)
Dept: FAMILY MEDICINE CLINIC | Facility: CLINIC | Age: 63
End: 2023-08-24
Payer: OTHER GOVERNMENT

## 2023-08-24 NOTE — TELEPHONE ENCOUNTER
PA obtained for Wegovy good through 2/2024, patient informed, he will send me pharmacy info to fax since he had to change pharmacies due to availabilty

## 2023-08-25 ENCOUNTER — PRE-ADMISSION TESTING (OUTPATIENT)
Dept: PREADMISSION TESTING | Facility: HOSPITAL | Age: 63
End: 2023-08-25
Payer: OTHER GOVERNMENT

## 2023-08-25 VITALS
WEIGHT: 275 LBS | HEART RATE: 80 BPM | TEMPERATURE: 97.7 F | BODY MASS INDEX: 34.19 KG/M2 | OXYGEN SATURATION: 96 % | HEIGHT: 75 IN | RESPIRATION RATE: 16 BRPM | SYSTOLIC BLOOD PRESSURE: 160 MMHG | DIASTOLIC BLOOD PRESSURE: 83 MMHG

## 2023-08-25 DIAGNOSIS — Z12.11 SCREENING FOR COLON CANCER: ICD-10-CM

## 2023-08-25 DIAGNOSIS — M79.89 MASS OF SOFT TISSUE OF NECK: ICD-10-CM

## 2023-08-25 LAB
ANION GAP SERPL CALCULATED.3IONS-SCNC: 12 MMOL/L (ref 5–15)
BUN SERPL-MCNC: 21 MG/DL (ref 8–23)
BUN/CREAT SERPL: 26.3 (ref 7–25)
CALCIUM SPEC-SCNC: 8.7 MG/DL (ref 8.6–10.5)
CHLORIDE SERPL-SCNC: 103 MMOL/L (ref 98–107)
CO2 SERPL-SCNC: 24 MMOL/L (ref 22–29)
CREAT SERPL-MCNC: 0.8 MG/DL (ref 0.76–1.27)
DEPRECATED RDW RBC AUTO: 41.9 FL (ref 37–54)
EGFRCR SERPLBLD CKD-EPI 2021: 99.4 ML/MIN/1.73
ERYTHROCYTE [DISTWIDTH] IN BLOOD BY AUTOMATED COUNT: 12.5 % (ref 12.3–15.4)
GLUCOSE SERPL-MCNC: 104 MG/DL (ref 65–99)
HCT VFR BLD AUTO: 46.6 % (ref 37.5–51)
HGB BLD-MCNC: 15.9 G/DL (ref 13–17.7)
MCH RBC QN AUTO: 31.2 PG (ref 26.6–33)
MCHC RBC AUTO-ENTMCNC: 34.1 G/DL (ref 31.5–35.7)
MCV RBC AUTO: 91.6 FL (ref 79–97)
PLATELET # BLD AUTO: 236 10*3/MM3 (ref 140–450)
PMV BLD AUTO: 10.6 FL (ref 6–12)
POTASSIUM SERPL-SCNC: 4.1 MMOL/L (ref 3.5–5.2)
QT INTERVAL: 362 MS
QTC INTERVAL: 399 MS
RBC # BLD AUTO: 5.09 10*6/MM3 (ref 4.14–5.8)
SODIUM SERPL-SCNC: 139 MMOL/L (ref 136–145)
WBC NRBC COR # BLD: 6.84 10*3/MM3 (ref 3.4–10.8)

## 2023-08-25 PROCEDURE — 93005 ELECTROCARDIOGRAM TRACING: CPT

## 2023-08-25 PROCEDURE — 85027 COMPLETE CBC AUTOMATED: CPT

## 2023-08-25 PROCEDURE — 36415 COLL VENOUS BLD VENIPUNCTURE: CPT

## 2023-08-25 PROCEDURE — 80048 BASIC METABOLIC PNL TOTAL CA: CPT

## 2023-08-25 PROCEDURE — 93010 ELECTROCARDIOGRAM REPORT: CPT | Performed by: STUDENT IN AN ORGANIZED HEALTH CARE EDUCATION/TRAINING PROGRAM

## 2023-08-25 RX ORDER — ACETAMINOPHEN 500 MG
500 TABLET ORAL EVERY 6 HOURS PRN
COMMUNITY

## 2023-08-25 NOTE — DISCHARGE INSTRUCTIONS
Take the following medications the morning of surgery:    NO MEDS AM OF SURGERY.  FOLLOW ALL INSTRUCTIONS FOR YOUR BOWEL PREP    TIME OF ARRIVAL 5:30      If you are on prescription narcotic pain medication to control your pain you may also take that medication the morning of surgery.    General Instructions:  Do not eat solid food after midnight the night before surgery.  You may drink clear liquids day of surgery but must stop at least one hour before your hospital arrival time.  It is beneficial for you to have a clear drink that contains carbohydrates the day of surgery.  We suggest a 12 to 20 ounce bottle of Gatorade or Powerade for non-diabetic patients or a 12 to 20 ounce bottle of G2 or Powerade Zero for diabetic patients. (Pediatric patients, are not advised to drink a 12 to 20 ounce carbohydrate drink)    Clear liquids are liquids you can see through.  Nothing red in color.     Plain water                               Sports drinks  Sodas                                   Gelatin (Jell-O)  Fruit juices without pulp such as white grape juice and apple juice  Popsicles that contain no fruit or yogurt  Tea or coffee (no cream or milk added)  Gatorade / Powerade  G2 / Powerade Zero    Infants may have breast milk up to four hours before surgery.  Infants drinking formula may drink formula up to six hours before surgery.   Patients who avoid smoking, chewing tobacco and alcohol for 4 weeks prior to surgery have a reduced risk of post-operative complications.  Quit smoking as many days before surgery as you can.  Do not smoke, use chewing tobacco or drink alcohol the day of surgery.   If applicable bring your C-PAP/ BI-PAP machine in with you to preop day of surgery.  Bring any papers given to you in the doctor's office.  Wear clean comfortable clothes.  Do not wear contact lenses, false eyelashes or make-up.  Bring a case for your glasses.   Bring crutches or walker if applicable.  Remove all piercings.  Leave  jewelry and any other valuables at home.  Hair extensions with metal clips must be removed prior to surgery.  The Pre-Admission Testing nurse will instruct you to bring medications if unable to obtain an accurate list in Pre-Admission Testing.            Preventing a Surgical Site Infection:  For 2 to 3 days before surgery, avoid shaving with a razor because the razor can irritate skin and make it easier to develop an infection.    Any areas of open skin can increase the risk of a post-operative wound infection by allowing bacteria to enter and travel throughout the body.  Notify your surgeon if you have any skin wounds / rashes even if it is not near the expected surgical site.  The area will need assessed to determine if surgery should be delayed until it is healed.  The night prior to surgery shower using a fresh bar of anti-bacterial soap (such as Dial) and clean washcloth.  Sleep in a clean bed with clean clothing.  Do not allow pets to sleep with you.  Shower on the morning of surgery using a fresh bar of anti-bacterial soap (such as Dial) and clean washcloth.  Dry with a clean towel and dress in clean clothing.  Ask your surgeon if you will be receiving antibiotics prior to surgery.  Make sure you, your family, and all healthcare providers clean their hands with soap and water or an alcohol based hand  before caring for you or your wound.    Day of surgery:  Your arrival time is approximately two hours before your scheduled surgery time.  Upon arrival, a Pre-op nurse and Anesthesiologist will review your health history, obtain vital signs, and answer questions you may have.  The only belongings needed at this time will be a list of your home medications and if applicable your C-PAP/BI-PAP machine.  A Pre-op nurse will start an IV and you may receive medication in preparation for surgery, including something to help you relax.     Please be aware that surgery does come with discomfort.  We want to  make every effort to control your discomfort so please discuss any uncontrolled symptoms with your nurse.   Your doctor will most likely have prescribed pain medications.      If you are going home after surgery you will receive individualized written care instructions before being discharged.  A responsible adult must drive you to and from the hospital on the day of your surgery and stay with you for 24 hours.  Discharge prescriptions can be filled by the hospital pharmacy during regular pharmacy hours.  If you are having surgery late in the day/evening your prescription may be e-prescribed to your pharmacy.  Please verify your pharmacy hours or chose a 24 hour pharmacy to avoid not having access to your prescription because your pharmacy has closed for the day.    If you are staying overnight following surgery, you will be transported to your hospital room following the recovery period.  [facility] has all private rooms.    If you have any questions please call Pre-Admission Testing at [phone].  Deductibles and co-payments are collected on the day of service. Please be prepared to pay the required co-pay, deductible or deposit on the day of service as defined by your plan.    Call your surgeon immediately if you experience any of the following symptoms:  Sore Throat  Shortness of Breath or difficulty breathing  Cough  Chills  Body soreness or muscle pain  Headache  Fever  New loss of taste or smell  Do not arrive for your surgery ill.  Your procedure will need to be rescheduled to another time.  You will need to call your physician before the day of surgery to avoid any unnecessary exposure to hospital staff as well as other patients.  CHLORHEXIDINE CLOTH INSTRUCTIONS  The morning of surgery follow these instructions using the Chlorhexidine cloths you've been given.  These steps reduce bacteria on the body.  Do not use the cloths near your eyes, ears mouth, genitalia or on open wounds.  Throw  the cloths away after use but do not try to flush them down a toilet.      Open and remove one cloth at a time from the package.    Leave the cloth unfolded and begin the bathing.  Massage the skin with the cloths using gentle pressure to remove bacteria.  Do not scrub harshly.   Follow the steps below with one 2% CHG cloth per area (6 total cloths).  One cloth for neck, shoulders and chest.  One cloth for both arms, hands, fingers and underarms (do underarms last).  One cloth for the abdomen followed by groin.  One cloth for right leg and foot including between the toes.  One cloth for left leg and foot including between the toes.  The last cloth is to be used for the back of the neck, back and buttocks.    Allow the CHG to air dry 3 minutes on the skin which will give it time to work and decrease the chance of irritation.  The skin may feel sticky until it is dry.  Do not rinse with water or any other liquid or you will lose the beneficial effects of the CHG.  If mild skin irritation occurs, do rinse the skin to remove the CHG.  Report this to the nurse at time of admission.  Do not apply lotions, creams, ointments, deodorants or perfumes after using the clothes. Dress in clean clothes before coming to the hospital.

## 2023-08-28 ENCOUNTER — ANESTHESIA (OUTPATIENT)
Dept: PERIOP | Facility: HOSPITAL | Age: 63
End: 2023-08-28
Payer: OTHER GOVERNMENT

## 2023-08-28 ENCOUNTER — ANESTHESIA EVENT (OUTPATIENT)
Dept: PERIOP | Facility: HOSPITAL | Age: 63
End: 2023-08-28
Payer: OTHER GOVERNMENT

## 2023-08-28 ENCOUNTER — HOSPITAL ENCOUNTER (OUTPATIENT)
Facility: HOSPITAL | Age: 63
Setting detail: HOSPITAL OUTPATIENT SURGERY
Discharge: HOME | End: 2023-08-28
Attending: SURGERY | Admitting: SURGERY
Payer: OTHER GOVERNMENT

## 2023-08-28 VITALS
DIASTOLIC BLOOD PRESSURE: 77 MMHG | RESPIRATION RATE: 16 BRPM | SYSTOLIC BLOOD PRESSURE: 128 MMHG | TEMPERATURE: 98 F | HEART RATE: 74 BPM | OXYGEN SATURATION: 90 %

## 2023-08-28 DIAGNOSIS — M79.89 MASS OF SOFT TISSUE OF NECK: ICD-10-CM

## 2023-08-28 DIAGNOSIS — Z12.11 SCREENING FOR COLON CANCER: ICD-10-CM

## 2023-08-28 PROBLEM — K64.8 INTERNAL HEMORRHOID: Status: ACTIVE | Noted: 2023-08-28

## 2023-08-28 PROBLEM — K63.5 COLON POLYPS: Status: ACTIVE | Noted: 2023-08-28

## 2023-08-28 PROCEDURE — 25010000002 CEFAZOLIN PER 500 MG: Performed by: SURGERY

## 2023-08-28 PROCEDURE — 88305 TISSUE EXAM BY PATHOLOGIST: CPT | Performed by: SURGERY

## 2023-08-28 PROCEDURE — 25010000002 PROPOFOL 10 MG/ML EMULSION: Performed by: NURSE ANESTHETIST, CERTIFIED REGISTERED

## 2023-08-28 PROCEDURE — 21933 EXC BACK TUM DEEP 5 CM/>: CPT | Performed by: SURGERY

## 2023-08-28 PROCEDURE — 25010000002 DEXAMETHASONE SODIUM PHOSPHATE 20 MG/5ML SOLUTION: Performed by: NURSE ANESTHETIST, CERTIFIED REGISTERED

## 2023-08-28 PROCEDURE — 25010000002 FENTANYL CITRATE (PF) 50 MCG/ML SOLUTION: Performed by: NURSE ANESTHETIST, CERTIFIED REGISTERED

## 2023-08-28 PROCEDURE — 25010000002 HYDROMORPHONE 1 MG/ML SOLUTION: Performed by: NURSE ANESTHETIST, CERTIFIED REGISTERED

## 2023-08-28 PROCEDURE — 25010000002 SUGAMMADEX 200 MG/2ML SOLUTION: Performed by: NURSE ANESTHETIST, CERTIFIED REGISTERED

## 2023-08-28 PROCEDURE — 45385 COLONOSCOPY W/LESION REMOVAL: CPT | Performed by: SURGERY

## 2023-08-28 PROCEDURE — 25010000002 ONDANSETRON PER 1 MG: Performed by: NURSE ANESTHETIST, CERTIFIED REGISTERED

## 2023-08-28 PROCEDURE — 45380 COLONOSCOPY AND BIOPSY: CPT | Performed by: SURGERY

## 2023-08-28 PROCEDURE — 88304 TISSUE EXAM BY PATHOLOGIST: CPT | Performed by: SURGERY

## 2023-08-28 RX ORDER — NALOXONE HCL 0.4 MG/ML
0.2 VIAL (ML) INJECTION AS NEEDED
Status: DISCONTINUED | OUTPATIENT
Start: 2023-08-28 | End: 2023-08-28 | Stop reason: HOSPADM

## 2023-08-28 RX ORDER — SODIUM CHLORIDE 9 MG/ML
40 INJECTION, SOLUTION INTRAVENOUS AS NEEDED
Status: DISCONTINUED | OUTPATIENT
Start: 2023-08-28 | End: 2023-08-28 | Stop reason: HOSPADM

## 2023-08-28 RX ORDER — DOCUSATE SODIUM 100 MG/1
100 CAPSULE, LIQUID FILLED ORAL 2 TIMES DAILY PRN
Status: DISCONTINUED | OUTPATIENT
Start: 2023-08-28 | End: 2023-08-28 | Stop reason: HOSPADM

## 2023-08-28 RX ORDER — SODIUM CHLORIDE 0.9 % (FLUSH) 0.9 %
10 SYRINGE (ML) INJECTION EVERY 12 HOURS SCHEDULED
Status: DISCONTINUED | OUTPATIENT
Start: 2023-08-28 | End: 2023-08-28 | Stop reason: HOSPADM

## 2023-08-28 RX ORDER — BUPIVACAINE HYDROCHLORIDE AND EPINEPHRINE 5; 5 MG/ML; UG/ML
INJECTION, SOLUTION EPIDURAL; INTRACAUDAL; PERINEURAL AS NEEDED
Status: DISCONTINUED | OUTPATIENT
Start: 2023-08-28 | End: 2023-08-28 | Stop reason: HOSPADM

## 2023-08-28 RX ORDER — ONDANSETRON 2 MG/ML
4 INJECTION INTRAMUSCULAR; INTRAVENOUS ONCE AS NEEDED
Status: DISCONTINUED | OUTPATIENT
Start: 2023-08-28 | End: 2023-08-28 | Stop reason: HOSPADM

## 2023-08-28 RX ORDER — IPRATROPIUM BROMIDE AND ALBUTEROL SULFATE 2.5; .5 MG/3ML; MG/3ML
3 SOLUTION RESPIRATORY (INHALATION) ONCE AS NEEDED
Status: DISCONTINUED | OUTPATIENT
Start: 2023-08-28 | End: 2023-08-28 | Stop reason: HOSPADM

## 2023-08-28 RX ORDER — HYDROMORPHONE HYDROCHLORIDE 1 MG/ML
0.5 INJECTION, SOLUTION INTRAMUSCULAR; INTRAVENOUS; SUBCUTANEOUS
Status: DISCONTINUED | OUTPATIENT
Start: 2023-08-28 | End: 2023-08-28 | Stop reason: HOSPADM

## 2023-08-28 RX ORDER — HYDRALAZINE HYDROCHLORIDE 20 MG/ML
5 INJECTION INTRAMUSCULAR; INTRAVENOUS
Status: DISCONTINUED | OUTPATIENT
Start: 2023-08-28 | End: 2023-08-28 | Stop reason: HOSPADM

## 2023-08-28 RX ORDER — SODIUM CHLORIDE 0.9 % (FLUSH) 0.9 %
3-10 SYRINGE (ML) INJECTION AS NEEDED
Status: DISCONTINUED | OUTPATIENT
Start: 2023-08-28 | End: 2023-08-28 | Stop reason: HOSPADM

## 2023-08-28 RX ORDER — DIPHENHYDRAMINE HYDROCHLORIDE 50 MG/ML
12.5 INJECTION INTRAMUSCULAR; INTRAVENOUS
Status: DISCONTINUED | OUTPATIENT
Start: 2023-08-28 | End: 2023-08-28 | Stop reason: HOSPADM

## 2023-08-28 RX ORDER — OXYCODONE HYDROCHLORIDE AND ACETAMINOPHEN 5; 325 MG/1; MG/1
1 TABLET ORAL ONCE AS NEEDED
Status: DISCONTINUED | OUTPATIENT
Start: 2023-08-28 | End: 2023-08-28 | Stop reason: HOSPADM

## 2023-08-28 RX ORDER — ONDANSETRON 2 MG/ML
INJECTION INTRAMUSCULAR; INTRAVENOUS AS NEEDED
Status: DISCONTINUED | OUTPATIENT
Start: 2023-08-28 | End: 2023-08-28 | Stop reason: SURG

## 2023-08-28 RX ORDER — ACETAMINOPHEN 500 MG
1000 TABLET ORAL ONCE
Status: COMPLETED | OUTPATIENT
Start: 2023-08-28 | End: 2023-08-28

## 2023-08-28 RX ORDER — GLYCOPYRROLATE 0.2 MG/ML
INJECTION INTRAMUSCULAR; INTRAVENOUS AS NEEDED
Status: DISCONTINUED | OUTPATIENT
Start: 2023-08-28 | End: 2023-08-28 | Stop reason: SURG

## 2023-08-28 RX ORDER — CELECOXIB 100 MG/1
200 CAPSULE ORAL ONCE
Status: COMPLETED | OUTPATIENT
Start: 2023-08-28 | End: 2023-08-28

## 2023-08-28 RX ORDER — DROPERIDOL 2.5 MG/ML
0.62 INJECTION, SOLUTION INTRAMUSCULAR; INTRAVENOUS
Status: DISCONTINUED | OUTPATIENT
Start: 2023-08-28 | End: 2023-08-28 | Stop reason: HOSPADM

## 2023-08-28 RX ORDER — PROPOFOL 10 MG/ML
VIAL (ML) INTRAVENOUS AS NEEDED
Status: DISCONTINUED | OUTPATIENT
Start: 2023-08-28 | End: 2023-08-28 | Stop reason: SURG

## 2023-08-28 RX ORDER — DEXAMETHASONE SODIUM PHOSPHATE 4 MG/ML
INJECTION, SOLUTION INTRA-ARTICULAR; INTRALESIONAL; INTRAMUSCULAR; INTRAVENOUS; SOFT TISSUE AS NEEDED
Status: DISCONTINUED | OUTPATIENT
Start: 2023-08-28 | End: 2023-08-28 | Stop reason: SURG

## 2023-08-28 RX ORDER — MAGNESIUM HYDROXIDE 1200 MG/15ML
LIQUID ORAL AS NEEDED
Status: DISCONTINUED | OUTPATIENT
Start: 2023-08-28 | End: 2023-08-28 | Stop reason: HOSPADM

## 2023-08-28 RX ORDER — EPHEDRINE SULFATE 50 MG/ML
5 INJECTION, SOLUTION INTRAVENOUS ONCE AS NEEDED
Status: DISCONTINUED | OUTPATIENT
Start: 2023-08-28 | End: 2023-08-28 | Stop reason: HOSPADM

## 2023-08-28 RX ORDER — ONDANSETRON 4 MG/1
4 TABLET, FILM COATED ORAL EVERY 8 HOURS PRN
Qty: 10 TABLET | Refills: 0 | Status: SHIPPED | OUTPATIENT
Start: 2023-08-28 | End: 2023-09-07

## 2023-08-28 RX ORDER — PROMETHAZINE HYDROCHLORIDE 25 MG/1
25 TABLET ORAL ONCE AS NEEDED
Status: DISCONTINUED | OUTPATIENT
Start: 2023-08-28 | End: 2023-08-28 | Stop reason: HOSPADM

## 2023-08-28 RX ORDER — FENTANYL CITRATE 50 UG/ML
INJECTION, SOLUTION INTRAMUSCULAR; INTRAVENOUS AS NEEDED
Status: DISCONTINUED | OUTPATIENT
Start: 2023-08-28 | End: 2023-08-28 | Stop reason: SURG

## 2023-08-28 RX ORDER — ACETAMINOPHEN 325 MG/1
650 TABLET ORAL ONCE
Status: DISCONTINUED | OUTPATIENT
Start: 2023-08-28 | End: 2023-08-28 | Stop reason: HOSPADM

## 2023-08-28 RX ORDER — SODIUM CHLORIDE 0.9 % (FLUSH) 0.9 %
10 SYRINGE (ML) INJECTION AS NEEDED
Status: DISCONTINUED | OUTPATIENT
Start: 2023-08-28 | End: 2023-08-28 | Stop reason: HOSPADM

## 2023-08-28 RX ORDER — SODIUM CHLORIDE, SODIUM LACTATE, POTASSIUM CHLORIDE, CALCIUM CHLORIDE 600; 310; 30; 20 MG/100ML; MG/100ML; MG/100ML; MG/100ML
9 INJECTION, SOLUTION INTRAVENOUS CONTINUOUS
Status: DISCONTINUED | OUTPATIENT
Start: 2023-08-28 | End: 2023-08-28 | Stop reason: HOSPADM

## 2023-08-28 RX ORDER — PROMETHAZINE HYDROCHLORIDE 25 MG/1
12.5 TABLET ORAL ONCE AS NEEDED
Status: DISCONTINUED | OUTPATIENT
Start: 2023-08-28 | End: 2023-08-28 | Stop reason: HOSPADM

## 2023-08-28 RX ORDER — LIDOCAINE HYDROCHLORIDE 10 MG/ML
0.5 INJECTION, SOLUTION INFILTRATION; PERINEURAL ONCE AS NEEDED
Status: DISCONTINUED | OUTPATIENT
Start: 2023-08-28 | End: 2023-08-28 | Stop reason: HOSPADM

## 2023-08-28 RX ORDER — FLUMAZENIL 0.1 MG/ML
0.2 INJECTION INTRAVENOUS AS NEEDED
Status: DISCONTINUED | OUTPATIENT
Start: 2023-08-28 | End: 2023-08-28 | Stop reason: HOSPADM

## 2023-08-28 RX ORDER — PROMETHAZINE HYDROCHLORIDE 25 MG/1
25 SUPPOSITORY RECTAL ONCE AS NEEDED
Status: DISCONTINUED | OUTPATIENT
Start: 2023-08-28 | End: 2023-08-28 | Stop reason: HOSPADM

## 2023-08-28 RX ORDER — LABETALOL HYDROCHLORIDE 5 MG/ML
5 INJECTION, SOLUTION INTRAVENOUS
Status: DISCONTINUED | OUTPATIENT
Start: 2023-08-28 | End: 2023-08-28 | Stop reason: HOSPADM

## 2023-08-28 RX ORDER — CEFAZOLIN SODIUM IN 0.9 % NACL 3 G/100 ML
3000 INTRAVENOUS SOLUTION, PIGGYBACK (ML) INTRAVENOUS ONCE
Status: COMPLETED | OUTPATIENT
Start: 2023-08-28 | End: 2023-08-28

## 2023-08-28 RX ORDER — OXYCODONE AND ACETAMINOPHEN 7.5; 325 MG/1; MG/1
1 TABLET ORAL EVERY 4 HOURS PRN
Status: DISCONTINUED | OUTPATIENT
Start: 2023-08-28 | End: 2023-08-28 | Stop reason: HOSPADM

## 2023-08-28 RX ORDER — ONDANSETRON 2 MG/ML
4 INJECTION INTRAMUSCULAR; INTRAVENOUS EVERY 6 HOURS PRN
Status: DISCONTINUED | OUTPATIENT
Start: 2023-08-28 | End: 2023-08-28 | Stop reason: HOSPADM

## 2023-08-28 RX ORDER — SODIUM CHLORIDE 0.9 % (FLUSH) 0.9 %
3 SYRINGE (ML) INJECTION EVERY 12 HOURS SCHEDULED
Status: DISCONTINUED | OUTPATIENT
Start: 2023-08-28 | End: 2023-08-28 | Stop reason: HOSPADM

## 2023-08-28 RX ORDER — FENTANYL CITRATE 50 UG/ML
50 INJECTION, SOLUTION INTRAMUSCULAR; INTRAVENOUS
Status: DISCONTINUED | OUTPATIENT
Start: 2023-08-28 | End: 2023-08-28 | Stop reason: HOSPADM

## 2023-08-28 RX ORDER — LIDOCAINE HYDROCHLORIDE 20 MG/ML
INJECTION, SOLUTION INFILTRATION; PERINEURAL AS NEEDED
Status: DISCONTINUED | OUTPATIENT
Start: 2023-08-28 | End: 2023-08-28 | Stop reason: SURG

## 2023-08-28 RX ORDER — ROCURONIUM BROMIDE 10 MG/ML
INJECTION, SOLUTION INTRAVENOUS AS NEEDED
Status: DISCONTINUED | OUTPATIENT
Start: 2023-08-28 | End: 2023-08-28 | Stop reason: SURG

## 2023-08-28 RX ORDER — HYDROCODONE BITARTRATE AND ACETAMINOPHEN 7.5; 325 MG/1; MG/1
1 TABLET ORAL ONCE AS NEEDED
Status: DISCONTINUED | OUTPATIENT
Start: 2023-08-28 | End: 2023-08-28 | Stop reason: HOSPADM

## 2023-08-28 RX ADMIN — ONDANSETRON 4 MG: 2 INJECTION INTRAMUSCULAR; INTRAVENOUS at 09:07

## 2023-08-28 RX ADMIN — SODIUM CHLORIDE, POTASSIUM CHLORIDE, SODIUM LACTATE AND CALCIUM CHLORIDE: 600; 310; 30; 20 INJECTION, SOLUTION INTRAVENOUS at 09:22

## 2023-08-28 RX ADMIN — SUGAMMADEX 200 MG: 100 INJECTION, SOLUTION INTRAVENOUS at 09:17

## 2023-08-28 RX ADMIN — GLYCOPYRROLATE 0.2 MG: 0.2 INJECTION INTRAMUSCULAR; INTRAVENOUS at 08:26

## 2023-08-28 RX ADMIN — SODIUM CHLORIDE, POTASSIUM CHLORIDE, SODIUM LACTATE AND CALCIUM CHLORIDE 9 ML/HR: 600; 310; 30; 20 INJECTION, SOLUTION INTRAVENOUS at 06:59

## 2023-08-28 RX ADMIN — HYDROMORPHONE HYDROCHLORIDE 0.5 MG: 1 INJECTION, SOLUTION INTRAMUSCULAR; INTRAVENOUS; SUBCUTANEOUS at 09:20

## 2023-08-28 RX ADMIN — CELECOXIB 200 MG: 100 CAPSULE ORAL at 07:00

## 2023-08-28 RX ADMIN — FENTANYL CITRATE 100 MCG: 50 INJECTION, SOLUTION INTRAMUSCULAR; INTRAVENOUS at 08:14

## 2023-08-28 RX ADMIN — ACETAMINOPHEN 1000 MG: 500 TABLET ORAL at 07:00

## 2023-08-28 RX ADMIN — LIDOCAINE HYDROCHLORIDE 100 MG: 20 INJECTION, SOLUTION INFILTRATION; PERINEURAL at 08:17

## 2023-08-28 RX ADMIN — OXYCODONE HYDROCHLORIDE AND ACETAMINOPHEN 1 TABLET: 5; 325 TABLET ORAL at 10:33

## 2023-08-28 RX ADMIN — FENTANYL CITRATE 50 MCG: 50 INJECTION, SOLUTION INTRAMUSCULAR; INTRAVENOUS at 08:56

## 2023-08-28 RX ADMIN — PROPOFOL 250 MG: 10 INJECTION, EMULSION INTRAVENOUS at 08:17

## 2023-08-28 RX ADMIN — ROCURONIUM BROMIDE 40 MG: 10 INJECTION, SOLUTION INTRAVENOUS at 08:17

## 2023-08-28 RX ADMIN — DEXAMETHASONE SODIUM PHOSPHATE 12 MG: 4 INJECTION, SOLUTION INTRAMUSCULAR; INTRAVENOUS at 08:23

## 2023-08-28 RX ADMIN — CEFAZOLIN 3000 MG: 10 INJECTION, POWDER, FOR SOLUTION INTRAVENOUS at 08:05

## 2023-08-28 NOTE — OP NOTE
PREOPERATIVE DIAGNOSES:    (1)  Soft tissue mass mid upper back  (2) Screening colonoscopy    POSTOPERATIVE DIAGNOSES:    (1)  Soft tissue mass consistent mid upper back 10 x 6 x 5 cm consistent with lipoma  (2) rectosigmoid and rectal polyps x4    PROCEDURE:    (1) Excisional biopsy of soft tissue mass mid upper back 10 x 6 x 5 cm  (2) Layered closure  (3) Flexible colonoscopy to the terminal ileum with snare polypectomy and cold forceps polypectomy    SURGEON/STAFF:  BALTAZAR Ty MD  ASSISTANT: Jorge Walter CSA and LUKASZ Leal that were in charge of closing, retraction, closing wound and placing dressing that was essential for the success of the case       NEEDLE AND SPONGE COUNT:  Correct.  EBL: minimal  SPECIMEN: 10 x 6 x 5 cm soft tissue mass,   INTRAOPERATIVE COMPLICATIONS:  None.   Andesthesia: General     INDICATIONS:  This is a pleasant patient who presented for evaluation of a large buldge located mid upper back.  He was symptomatic from the buldge.  He is a candidate for resection. He is also due for screening colonoscopy.  He was informed of the risks and benefits of conservative verses surgical management.  Risk of bleeding, infection, perforation,  wound complications discussed in detail with the patient that verbalized understanding and agree with the plan    OPERATION:  The patient was brought to the operating room in stable condition.  Perioperative antibiotics and sequential compression devices applied.  He was laid supine on the operating room table. General anesthesia was induced by the Anesthesia Service without difficulty.  Next the patient was positioned in the left lateral decubitus position.  All extremities were appropriately padded and positioned.      Next the skin overlying the mass was injected with half percent marcaine.  An 3 x 1 cm elliptical  incision was made and the subcutaneous tissues were dissected down to the level of the lipoma.  The lipoma was encapsulated.  All  portions of the lipoma had fingerlike projections extending out laterally.  The entire mass was carefully dissected free of the surrounding tissues that reached the muscular fascia.  The mass measured 10 x 6 x 5 cm.  It was then marked superiorly and laterally with sutures to maintain orientation for the pathologists.      Next the wound bed was irrigated, and hemostasis was seen to be achieved. The wound was then closed in 2 layers with 3-0 and 4-0 vicryl. The wound was covered with surgical glue.    I then proceeded with colonoscopy.  Perianal examination revealed no abnormalities, digital rectal examination revealed no abnormalities and no masses at the rectal vault that was empty.  Flexible colonoscopy was inserted through the rectum and advanced under direct vision into the terminal ileum.  Distal terminal ileum was visualized and this was found to be normal.  Scope was slowly retrieved and there was moderate amount of liquid stool throughout the colon.  Irrigation was necessary to clear the colon to allow visualization of release polyps greater than 6 millimeters.  Appendiceal orifice was recognized.  Scope was withdrawal, hepatic flexure was recognized and photograp.  Scope was withdrawn slowly.  Transverse and descending colon were within normal limits.  There was a 7 mm rectosigmoid polyp was multilobulated.  The polyp was removed with hot snare.  Removal was completed.  Polyp was sent for pathological analysis.  There were other 2 small 1 to 2 mm polyps at the upper rectum that were removed with cold forceps.  Removal was complete.  Specimen was sent for pathological analysis.  There were other 2 polyps in the mid rectum.  1 polyp was approximately 5 mm and was removed with hot snare.  Removal was completed.  There was another small polyp in the mid rectum that was removed with cold forceps.  Removal was completed.  There was minimal bleeding from all the polypectomies.  Retroflexion show evidence of  internal hemorrhoids grade 1.  There were no other abnormalities.  Scope was completely removed.    The patient tolerated the procedure well. Instruments and sponge count was correct.  Patient was sent to recovery area in stable condition.    Jason Ty MD  General, Minimally Invasive and Endoscopic Surgery  [facility]    [address], [address]  [place], [place], [address]  P: [phone]  F: [phone]

## 2023-08-28 NOTE — ANESTHESIA POSTPROCEDURE EVALUATION
Patient: Aguilar Kennedy    Procedure Summary       Date: 08/28/23 Room / Location: [facility] OR [address] [place] [address] MAIN OR    Anesthesia Start: 0811 Anesthesia Stop: 1005    Procedures:       MASS SOFT TISSUE EXCISION neck      COLONOSCOPY; polypectomy Diagnosis:       Mass of soft tissue of neck      Screening for colon cancer      (Mass of soft tissue of neck [M79.89])      (Screening for colon cancer [Z12.11])    Surgeons: Jason Ty MD Provider: Kalin Wilson MD    Anesthesia Type: general ASA Status: 3            Anesthesia Type: general    Vitals  Vitals Value Taken Time   /83 08/28/23 1045   Temp 36.7 øC (98 øF) 08/28/23 1001   Pulse 76 08/28/23 1050   Resp 16 08/28/23 1030   SpO2 91 % 08/28/23 1050   Vitals shown include unvalidated device data.        Post Anesthesia Care and Evaluation    Level of consciousness: awake and alert  Pain management: adequate    Airway patency: patent  Anesthetic complications: No anesthetic complications  PONV Status: none  Cardiovascular status: acceptable  Respiratory status: acceptable  Hydration status: acceptable    Comments: /80   Pulse 70   Temp 36.7 øC (98 øF) (Oral)   Resp 16   SpO2 92%

## 2023-08-28 NOTE — ANESTHESIA PREPROCEDURE EVALUATION
Anesthesia Evaluation     Patient summary reviewed and Nursing notes reviewed   no history of anesthetic complications:   NPO Solid Status: > 8 hours  NPO Liquid Status: > 8 hours           Airway   Mallampati: II  TM distance: >3 FB  Neck ROM: full  No difficulty expected  Dental    (+) implants    Comment: Upper right     Pulmonary    (+) ,sleep apnea on CPAP  (-) asthma, not a smoker  Cardiovascular   Exercise tolerance: good (4-7 METS)    Rhythm: regular  Rate: normal    (+) hyperlipidemia  (-) hypertension, CAD, dysrhythmias, angina, LEONARD, murmur      Neuro/Psych  (-) seizures, CVA  GI/Hepatic/Renal/Endo    (+) obesity  (-) liver disease, no renal disease, diabetes, no thyroid disorder    Musculoskeletal     Abdominal     Abdomen: soft.   Substance History      OB/GYN          Other                      Anesthesia Plan    ASA 3     general     intravenous induction     Anesthetic plan, risks, benefits, and alternatives have been provided, discussed and informed consent has been obtained with: patient.    CODE STATUS:

## 2023-08-28 NOTE — ANESTHESIA PROCEDURE NOTES
Airway  Urgency: elective    Date/Time: 8/28/2023 8:21 AM  Airway not difficult    General Information and Staff    Patient location during procedure: OR  Anesthesiologist: Kalin Wilson MD  CRNA/CAA: Alicia Pulido CRNA    Indications and Patient Condition  Indications for airway management: airway protection    Preoxygenated: yes  MILS maintained throughout  Mask difficulty assessment: 2 - vent by mask + OA or adjuvant +/- NMBA    Final Airway Details  Final airway type: endotracheal airway      Successful airway: ETT  Cuffed: yes   Successful intubation technique: direct laryngoscopy  Facilitating devices/methods: intubating stylet and cricoid pressure  Endotracheal tube insertion site: oral  Blade: Kim  Blade size: 2  ETT size (mm): 8.0  Cormack-Lehane Classification: grade IIa - partial view of glottis  Placement verified by: chest auscultation and capnometry   Cuff volume (mL): 10  Measured from: teeth  ETT/EBT  to teeth (cm): 23  Number of attempts at approach: 1  Assessment: lips, teeth, and gum same as pre-op and atraumatic intubation

## 2023-08-30 ENCOUNTER — TELEPHONE (OUTPATIENT)
Dept: FAMILY MEDICINE CLINIC | Facility: CLINIC | Age: 63
End: 2023-08-30
Payer: OTHER GOVERNMENT

## 2023-08-30 LAB
LAB AP CASE REPORT: NORMAL
PATH REPORT.FINAL DX SPEC: NORMAL
PATH REPORT.GROSS SPEC: NORMAL

## 2023-08-30 NOTE — TELEPHONE ENCOUNTER
Caller: Aguilar Kennedy    Relationship: Self    Best call back number: [phone]     What was the call regarding:   THE PATIENT WAS CALLED BY [facility] WHO TOLD HIM THEY ARE UNABLE TO FILL HIS WEGOVY PRESCRIPTION DUE TO THE PRESCRIPTION NOT HAVING DOSAGE OR INSTRUCTIONS. THEY ARE REQUESTING A NEW PRESCRIPTION.

## 2023-09-28 ENCOUNTER — TELEPHONE (OUTPATIENT)
Dept: SURGERY | Facility: CLINIC | Age: 63
End: 2023-09-28
Payer: OTHER GOVERNMENT

## 2023-09-28 ENCOUNTER — TELEPHONE (OUTPATIENT)
Dept: FAMILY MEDICINE CLINIC | Facility: CLINIC | Age: 63
End: 2023-09-28
Payer: OTHER GOVERNMENT

## 2023-09-28 NOTE — TELEPHONE ENCOUNTER
Informed patient there is no alternative, this med is on backorder and should be available next month.

## 2023-09-28 NOTE — TELEPHONE ENCOUNTER
I called and spoke with Aguilar Kennedy regarding his soft tissue mass excision on colonoscopy results .     Pathology report:   Final Diagnosis   1. Soft Tissue, Neck, Oriented Excision:                 A. Fibrolipoma.               B. Unremarkable skin.     2. Rectosigmoid Colon, Polyp, Polypectomy:                 A. Fragments of hyperplastic polyp.     3. Upper Rectum, Polyps, Polypectomies:                 A. Hyperplastic polyps.     4. Mid Rectum, Polyps, Polypectomies:               A. Hyperplastic polyps.     He reports he has wounds are well-healed and did not have any problem.  I recommend that he undergoes colonoscopy in 5 years for surveillance    He verbalized understanding and agreed    Jason Ty MD  General, Minimally Invasive and Endoscopic Surgery  [facility]    [address], [address]  [place], [place], [address]  P: [phone]  F: [phone]

## 2024-04-17 PROBLEM — Z71.3 WEIGHT LOSS COUNSELING, ENCOUNTER FOR: Status: RESOLVED | Noted: 2023-07-28 | Resolved: 2024-04-17

## 2024-04-17 PROBLEM — Z00.00 PREVENTATIVE HEALTH CARE: Status: RESOLVED | Noted: 2023-07-28 | Resolved: 2024-04-17

## 2024-04-17 PROBLEM — Z12.5 PROSTATE CANCER SCREENING: Status: RESOLVED | Noted: 2023-07-28 | Resolved: 2024-04-17

## 2024-04-17 NOTE — PROGRESS NOTES
"Chief Complaint  Annual Exam    Subjective        Aguilar Kennedy presents to [facility]  History of Present Illness  63-year-old white male with a history of sleep apnea, changing mole and a mass here for 9-month follow-up visit and annual physical visit.  Patient is concerned about carotid artery disease and reports his brother had issues with carotid artery.  Patient does report occasional dizziness.  Instructed patient to get the adult preventive immunization that are due at  the pharmacy, including -Tdap, shingles, RSV and COVID booster.  Pt s/p urology 1 mo ago and s/p blood testing.    Pt uses CPAP qhs and tolerates well.    Pt c/o chronic LBP x 20 years and now c/o flare up of LBP x 1 mo. Pt already follows a chiropractor - [facility]  in [place], [place] routinely.    Pt follows RADHA Lawrence at wt loss clinic and now on wt loss med and lost several pounds.  Patient reports he takes compounding pharmacy version of Wegovy prescribed by weight loss clinic specialist..  Counseled patient on risks of taking medication that not totally FDA approved.  Patient desires Wegovy refill from his PCP today..    Patient reports he has not seen the dermatologist for mole on his right temple.  Patient has not seen the ophthalmologist for left eye deviation counseled patient to follow with [facility] and contact information provided to patient today.    Discussed elevated PSA patient reports he has seen urologist recently.  Patient reports he uses CPAP machine at night.      Objective   Vital Signs:  /70 (BP Location: Left arm, Patient Position: Sitting, Cuff Size: Adult)   Pulse 90   Temp 97.3 °F (36.3 °C) (Temporal)   Ht 190.5 cm (75\")   Wt 102 kg (225 lb 12.8 oz)   SpO2 96%   BMI 28.22 kg/m²   Estimated body mass index is 28.22 kg/m² as calculated from the following:    Height as of this encounter: 190.5 cm (75\").    Weight as of this encounter: 102 kg (225 lb " 12.8 oz).               Physical Exam  Constitutional:       Appearance: Normal appearance.   HENT:      Head: Normocephalic and atraumatic.   Eyes:      Conjunctiva/sclera: Conjunctivae normal.      Comments: Left eye lateral deviation.   Cardiovascular:      Rate and Rhythm: Normal rate and regular rhythm.      Heart sounds: Normal heart sounds.   Pulmonary:      Effort: Pulmonary effort is normal.      Breath sounds: Normal breath sounds.   Abdominal:      General: Bowel sounds are normal.      Palpations: Abdomen is soft.      Comments: Non-tender   Skin:     General: Skin is warm.      Comments: Dark brown-black rash on right temple.   Neurological:      General: No focal deficit present.      Mental Status: He is alert and oriented to person, place, and time.   Psychiatric:         Mood and Affect: Mood normal.         Behavior: Behavior normal.        Result Review :    The following data was reviewed by: ELVIS Jacobo on 04/19/2024:  CMP          7/28/2023    15:47 8/25/2023    09:20   CMP   Glucose 86  104    BUN 15  21    Creatinine 0.94  0.80    EGFR  99.4    Sodium 139  139    Potassium 4.7  4.1    Chloride 101  103    Calcium 9.3  8.7    Total Protein 7.2     Albumin 4.6     Globulin 2.6     Total Bilirubin 0.6     Alkaline Phosphatase 64     AST (SGOT) 21     ALT (SGPT) 21     BUN/Creatinine Ratio 16  26.3    Anion Gap  12.0      CBC          7/28/2023    15:47 8/25/2023    09:20   CBC   WBC 11.5  6.84    RBC 5.31  5.09    Hemoglobin 16.2  15.9    Hematocrit 46.8  46.6    MCV 88  91.6    MCH 30.5  31.2    MCHC 34.6  34.1    RDW 12.2  12.5    Platelets 273  236      Lipid Panel          7/28/2023    15:47   Lipid Panel   Total Cholesterol 204    Triglycerides 91    HDL Cholesterol 50    VLDL Cholesterol 16    LDL Cholesterol  138      TSH          7/28/2023    15:47   TSH   TSH 1.120          PSA          7/28/2023    15:47   PSA   PSA 7.8                   Assessment and Plan      Diagnoses and all orders for this visit:    1. Annual physical exam (Primary)  Assessment & Plan:  Counseling was provided on nutrition, physical activity, development, and injury prevention, dental health, and safe sex practices patient verbalizes understanding no additional questions were asked.           2. Class 1 obesity due to excess calories without serious comorbidity with body mass index (BMI) of 32.0 to 32.9 in adult  Assessment & Plan:  Patient no longer obese has lost several pounds on Wegovy from weight loss clinic.  Discussed the importance of healthy diet, nutrition, and lifestyle. Recommend low salt, fat/cholesterol diet and avoid concentrated sweets. Encouraged DASH diet along with fresh fruits & vegetables and low fat dairy products. Counseled patient to exercise/walk as tolerated. Avoid tobacco and alcohol use.      Orders:  -     Discontinue: Semaglutide-Weight Management (Wegovy) 0.25 MG/0.5ML solution auto-injector; Inject 0.5 mL under the skin into the appropriate area as directed 1 (One) Time Per Week for 30 days.  Dispense: 2 mL; Refill: 0  -     Discontinue: Semaglutide-Weight Management (Wegovy) 0.25 MG/0.5ML solution auto-injector; Inject 0.5 mL under the skin into the appropriate area as directed 1 (One) Time Per Week for 30 days.  Dispense: 2 mL; Refill: 0  -     Semaglutide-Weight Management (Wegovy) 0.25 MG/0.5ML solution auto-injector; Inject 0.5 mL under the skin into the appropriate area as directed 1 (One) Time Per Week.  Dispense: 2 mL; Refill: 1    3. GERRI on CPAP  Assessment & Plan:  Stable      4. Change in mole  Assessment & Plan:  Follow-up dermatology    Orders:  -     Ambulatory Referral to Dermatology    5. Mixed hyperlipidemia  Assessment & Plan:  Discussed the importance of healthy diet, nutrition, and lifestyle. Recommend low salt, fat/cholesterol diet and avoid concentrated sweets. Encouraged DASH diet along with fresh fruits & vegetables and low fat dairy  products. Counseled patient to exercise/walk as tolerated. Avoid tobacco and alcohol use.      Orders:  -     atorvastatin (LIPITOR) 10 MG tablet; Take 1 tablet by mouth Every Night.  Dispense: 90 tablet; Refill: 1  -     Comprehensive Metabolic Panel  -     Lipid Panel    6. Elevated PSA  Assessment & Plan:  Patient currently follows urology      7. Dizziness  -     Duplex Carotid Ultrasound CAR; Future    8. Exotropia of left eye  Assessment & Plan:  Follow-up ophthalmology.      9. Chronic midline low back pain with right-sided sciatica  Assessment & Plan:  X-ray requisition for lumbar spine x-ray provided to patient.  Also recommend physical therapy.    Orders:  -     XR Spine Lumbar 2 or 3 View; Future  -     baclofen (LIORESAL) 10 MG tablet; Take 1 tablet by mouth At Night As Needed for Muscle Spasms for up to 90 days.  Dispense: 30 tablet; Refill: 1  -     Diclofenac Sodium (VOLTAREN) 1 % gel gel; Apply 4 g topically to the appropriate area as directed 2 (Two) Times a Day As Needed (MSK pain) for up to 90 days.  Dispense: 100 g; Refill: 2  -     Ambulatory Referral to Physical Therapy Evaluate and treat      All chronic conditions have been addressed and treated by the practice or other specialists. Medications have been reconciled and refilled as appropriate. Reiterated compliance and timely follow up appointments. Side effects of all new and old medications reviewed with the patient and patient willing to accept all risks involved. Advised RTO if no improvement or worsening of symptoms or if any new complaints arise. Patient advised to follow up with clinic or call after diagnostic tests, if patient does not hear from office 3 days after the test completion.            Follow Up     Return in about 3 months (around 7/19/2024) for Next scheduled follow up.  Patient was given instructions and counseling regarding his condition or for health maintenance advice. Please see specific information pulled into the  AVS if appropriate.

## 2024-04-19 ENCOUNTER — OFFICE VISIT (OUTPATIENT)
Dept: FAMILY MEDICINE CLINIC | Facility: CLINIC | Age: 64
End: 2024-04-19
Payer: OTHER GOVERNMENT

## 2024-04-19 VITALS
WEIGHT: 225.8 LBS | HEIGHT: 75 IN | DIASTOLIC BLOOD PRESSURE: 70 MMHG | SYSTOLIC BLOOD PRESSURE: 104 MMHG | OXYGEN SATURATION: 96 % | TEMPERATURE: 97.3 F | HEART RATE: 90 BPM | BODY MASS INDEX: 28.07 KG/M2

## 2024-04-19 DIAGNOSIS — Z00.00 ANNUAL PHYSICAL EXAM: Primary | ICD-10-CM

## 2024-04-19 DIAGNOSIS — R97.20 ELEVATED PSA: ICD-10-CM

## 2024-04-19 DIAGNOSIS — E78.2 MIXED HYPERLIPIDEMIA: Chronic | ICD-10-CM

## 2024-04-19 DIAGNOSIS — G89.29 CHRONIC MIDLINE LOW BACK PAIN WITH RIGHT-SIDED SCIATICA: Chronic | ICD-10-CM

## 2024-04-19 DIAGNOSIS — G47.33 OSA ON CPAP: Chronic | ICD-10-CM

## 2024-04-19 DIAGNOSIS — E66.09 CLASS 1 OBESITY DUE TO EXCESS CALORIES WITHOUT SERIOUS COMORBIDITY WITH BODY MASS INDEX (BMI) OF 32.0 TO 32.9 IN ADULT: Chronic | ICD-10-CM

## 2024-04-19 DIAGNOSIS — H50.112 EXOTROPIA OF LEFT EYE: Chronic | ICD-10-CM

## 2024-04-19 DIAGNOSIS — M54.41 CHRONIC MIDLINE LOW BACK PAIN WITH RIGHT-SIDED SCIATICA: Chronic | ICD-10-CM

## 2024-04-19 DIAGNOSIS — R42 DIZZINESS: ICD-10-CM

## 2024-04-19 DIAGNOSIS — D22.9 CHANGE IN MOLE: ICD-10-CM

## 2024-04-19 RX ORDER — ATORVASTATIN CALCIUM 10 MG/1
10 TABLET, FILM COATED ORAL NIGHTLY
Qty: 90 TABLET | Refills: 1 | Status: SHIPPED | OUTPATIENT
Start: 2024-04-19

## 2024-04-19 RX ORDER — BACLOFEN 10 MG/1
10 TABLET ORAL NIGHTLY PRN
Qty: 30 TABLET | Refills: 1 | Status: SHIPPED | OUTPATIENT
Start: 2024-04-19 | End: 2024-07-18

## 2024-04-19 RX ORDER — SEMAGLUTIDE 0.25 MG/.5ML
0.25 INJECTION, SOLUTION SUBCUTANEOUS WEEKLY
Qty: 2 ML | Refills: 1 | Status: SHIPPED | OUTPATIENT
Start: 2024-04-19

## 2024-04-19 RX ORDER — SEMAGLUTIDE 0.25 MG/.5ML
0.25 INJECTION, SOLUTION SUBCUTANEOUS WEEKLY
Qty: 2 ML | Refills: 0 | Status: SHIPPED | OUTPATIENT
Start: 2024-04-19 | End: 2024-04-19

## 2024-04-19 NOTE — ASSESSMENT & PLAN NOTE
Patient no longer obese has lost several pounds on Wegovy from weight loss clinic.  Discussed the importance of healthy diet, nutrition, and lifestyle. Recommend low salt, fat/cholesterol diet and avoid concentrated sweets. Encouraged DASH diet along with fresh fruits & vegetables and low fat dairy products. Counseled patient to exercise/walk as tolerated. Avoid tobacco and alcohol use.

## 2024-04-19 NOTE — PATIENT INSTRUCTIONS
Follow up Vinay Locke  [address], [place] [address] ·   Tel # [phone]    Left lateral eye deviation or  Left EXOTROPIA.

## 2024-04-20 LAB
ALBUMIN SERPL-MCNC: 4 G/DL (ref 3.9–4.9)
ALBUMIN/GLOB SERPL: 1.7 {RATIO} (ref 1.2–2.2)
ALP SERPL-CCNC: 72 IU/L (ref 44–121)
ALT SERPL-CCNC: 15 IU/L (ref 0–44)
AST SERPL-CCNC: 14 IU/L (ref 0–40)
BILIRUB SERPL-MCNC: 0.3 MG/DL (ref 0–1.2)
BUN SERPL-MCNC: 20 MG/DL (ref 8–27)
BUN/CREAT SERPL: 24 (ref 10–24)
CALCIUM SERPL-MCNC: 8.9 MG/DL (ref 8.6–10.2)
CHLORIDE SERPL-SCNC: 107 MMOL/L (ref 96–106)
CHOLEST SERPL-MCNC: 186 MG/DL (ref 100–199)
CO2 SERPL-SCNC: 19 MMOL/L (ref 20–29)
CREAT SERPL-MCNC: 0.85 MG/DL (ref 0.76–1.27)
EGFRCR SERPLBLD CKD-EPI 2021: 98 ML/MIN/1.73
GLOBULIN SER CALC-MCNC: 2.4 G/DL (ref 1.5–4.5)
GLUCOSE SERPL-MCNC: ABNORMAL MG/DL
HDLC SERPL-MCNC: 41 MG/DL
LDLC SERPL CALC-MCNC: 122 MG/DL (ref 0–99)
POTASSIUM SERPL-SCNC: ABNORMAL MMOL/L
PROT SERPL-MCNC: 6.4 G/DL (ref 6–8.5)
SODIUM SERPL-SCNC: 143 MMOL/L (ref 134–144)
TRIGL SERPL-MCNC: 126 MG/DL (ref 0–149)
VLDLC SERPL CALC-MCNC: 23 MG/DL (ref 5–40)

## 2024-05-24 ENCOUNTER — HOSPITAL ENCOUNTER (OUTPATIENT)
Dept: CARDIOLOGY | Facility: HOSPITAL | Age: 64
Discharge: HOME | End: 2024-05-24
Payer: OTHER GOVERNMENT

## 2024-05-24 DIAGNOSIS — R42 DIZZINESS: ICD-10-CM

## 2024-05-24 LAB
BH CV XLRA MEAS LEFT DIST CCA EDV: 28.6 CM/SEC
BH CV XLRA MEAS LEFT DIST CCA PSV: 85.7 CM/SEC
BH CV XLRA MEAS LEFT DIST ICA EDV: -26.7 CM/SEC
BH CV XLRA MEAS LEFT DIST ICA PSV: -64.7 CM/SEC
BH CV XLRA MEAS LEFT ICA/CCA RATIO: 0.86
BH CV XLRA MEAS LEFT MID ICA EDV: -28 CM/SEC
BH CV XLRA MEAS LEFT MID ICA PSV: -73.7 CM/SEC
BH CV XLRA MEAS LEFT PROX CCA EDV: 31.5 CM/SEC
BH CV XLRA MEAS LEFT PROX CCA PSV: 91.8 CM/SEC
BH CV XLRA MEAS LEFT PROX ECA EDV: -26.1 CM/SEC
BH CV XLRA MEAS LEFT PROX ECA PSV: -98.2 CM/SEC
BH CV XLRA MEAS LEFT PROX ICA EDV: -30.2 CM/SEC
BH CV XLRA MEAS LEFT PROX ICA PSV: -71.9 CM/SEC
BH CV XLRA MEAS LEFT PROX SCLA PSV: 149.7 CM/SEC
BH CV XLRA MEAS LEFT VERTEBRAL A EDV: 11.8 CM/SEC
BH CV XLRA MEAS LEFT VERTEBRAL A PSV: 38.2 CM/SEC
BH CV XLRA MEAS RIGHT DIST CCA EDV: 26.9 CM/SEC
BH CV XLRA MEAS RIGHT DIST CCA PSV: 94.4 CM/SEC
BH CV XLRA MEAS RIGHT DIST ICA EDV: -32.4 CM/SEC
BH CV XLRA MEAS RIGHT DIST ICA PSV: -78.1 CM/SEC
BH CV XLRA MEAS RIGHT ICA/CCA RATIO: 1.01
BH CV XLRA MEAS RIGHT MID ICA EDV: -29.5 CM/SEC
BH CV XLRA MEAS RIGHT MID ICA PSV: -72.7 CM/SEC
BH CV XLRA MEAS RIGHT PROX CCA EDV: 33.5 CM/SEC
BH CV XLRA MEAS RIGHT PROX CCA PSV: 102.5 CM/SEC
BH CV XLRA MEAS RIGHT PROX ECA EDV: -47.5 CM/SEC
BH CV XLRA MEAS RIGHT PROX ECA PSV: -175.3 CM/SEC
BH CV XLRA MEAS RIGHT PROX ICA EDV: -29.2 CM/SEC
BH CV XLRA MEAS RIGHT PROX ICA PSV: -95.7 CM/SEC
BH CV XLRA MEAS RIGHT PROX SCLA PSV: 165.6 CM/SEC
BH CV XLRA MEAS RIGHT VERTEBRAL A EDV: 6.5 CM/SEC
BH CV XLRA MEAS RIGHT VERTEBRAL A PSV: 38.8 CM/SEC

## 2024-05-24 PROCEDURE — 93880 EXTRACRANIAL BILAT STUDY: CPT

## (undated) DEVICE — GLV SURG BIOGEL LTX PF 7 1/2

## (undated) DEVICE — SNAR POLYP SENSATION STDOVL 27 240 BX40

## (undated) DEVICE — SINGLE-USE BIOPSY FORCEPS: Brand: RADIAL JAW 4

## (undated) DEVICE — STPLR SKIN VISISTAT WD 35CT

## (undated) DEVICE — TBG PENCL TELESCP MEGADYNE SMOKE EVAC 10FT

## (undated) DEVICE — IRRIGATOR BULB ASEPTO 60CC STRL

## (undated) DEVICE — PK ENT 40

## (undated) DEVICE — ELECTRD BLD EZ CLN MOD XLNG 2.75IN

## (undated) DEVICE — ANTIBACTERIAL UNDYED BRAIDED (POLYGLACTIN 910), SYNTHETIC ABSORBABLE SUTURE: Brand: COATED VICRYL

## (undated) DEVICE — TRAP FLD MINIVAC MEGADYNE 100ML